# Patient Record
Sex: MALE | Race: WHITE | NOT HISPANIC OR LATINO
[De-identification: names, ages, dates, MRNs, and addresses within clinical notes are randomized per-mention and may not be internally consistent; named-entity substitution may affect disease eponyms.]

---

## 2018-08-09 ENCOUNTER — APPOINTMENT (OUTPATIENT)
Dept: FAMILY MEDICINE | Facility: CLINIC | Age: 58
End: 2018-08-09
Payer: COMMERCIAL

## 2018-08-09 VITALS
SYSTOLIC BLOOD PRESSURE: 136 MMHG | OXYGEN SATURATION: 100 % | HEIGHT: 72 IN | HEART RATE: 54 BPM | DIASTOLIC BLOOD PRESSURE: 85 MMHG | BODY MASS INDEX: 25.73 KG/M2 | RESPIRATION RATE: 14 BRPM | WEIGHT: 190 LBS

## 2018-08-09 PROCEDURE — 99213 OFFICE O/P EST LOW 20 MIN: CPT

## 2018-08-14 NOTE — PLAN
[FreeTextEntry1] : Patient will keep me informed regarding potential exposure to asbestos. Consider occ med referral. \par Discussed mindfulness meditation to reduce anxiety.

## 2018-08-14 NOTE — HISTORY OF PRESENT ILLNESS
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

## 2018-08-14 NOTE — PHYSICAL EXAM
[No Acute Distress] : no acute distress [Well Nourished] : well nourished [Well Developed] : well developed [Well-Appearing] : well-appearing [Normal Sclera/Conjunctiva] : normal sclera/conjunctiva [PERRL] : pupils equal round and reactive to light [Normal Outer Ear/Nose] : the outer ears and nose were normal in appearance [Normal Oropharynx] : the oropharynx was normal [Normal TMs] : both tympanic membranes were normal [No JVD] : no jugular venous distention [Supple] : supple [No Lymphadenopathy] : no lymphadenopathy [No Respiratory Distress] : no respiratory distress  [Clear to Auscultation] : lungs were clear to auscultation bilaterally [No Accessory Muscle Use] : no accessory muscle use [Normal Rate] : normal rate  [Regular Rhythm] : with a regular rhythm [Normal S1, S2] : normal S1 and S2 [No Murmur] : no murmur heard [Pedal Pulses Present] : the pedal pulses are present [No Edema] : there was no peripheral edema [No Extremity Clubbing/Cyanosis] : no extremity clubbing/cyanosis [Soft] : abdomen soft [Non Tender] : non-tender [Non-distended] : non-distended [Normal Bowel Sounds] : normal bowel sounds [Normal Supraclavicular Nodes] : no supraclavicular lymphadenopathy [Normal Posterior Cervical Nodes] : no posterior cervical lymphadenopathy [Normal Anterior Cervical Nodes] : no anterior cervical lymphadenopathy [No CVA Tenderness] : no CVA  tenderness [No Spinal Tenderness] : no spinal tenderness [No Joint Swelling] : no joint swelling [Grossly Normal Strength/Tone] : grossly normal strength/tone [No Rash] : no rash [Normal Gait] : normal gait [Coordination Grossly Intact] : coordination grossly intact [No Focal Deficits] : no focal deficits [Normal Affect] : the affect was normal [Normal Insight/Judgement] : insight and judgment were intact

## 2018-10-05 ENCOUNTER — MEDICATION RENEWAL (OUTPATIENT)
Age: 58
End: 2018-10-05

## 2019-07-01 ENCOUNTER — APPOINTMENT (OUTPATIENT)
Dept: FAMILY MEDICINE | Facility: CLINIC | Age: 59
End: 2019-07-01

## 2019-08-08 ENCOUNTER — APPOINTMENT (OUTPATIENT)
Dept: FAMILY MEDICINE | Facility: CLINIC | Age: 59
End: 2019-08-08
Payer: COMMERCIAL

## 2019-08-08 VITALS
HEART RATE: 59 BPM | DIASTOLIC BLOOD PRESSURE: 81 MMHG | OXYGEN SATURATION: 100 % | SYSTOLIC BLOOD PRESSURE: 137 MMHG | HEIGHT: 72 IN | RESPIRATION RATE: 15 BRPM | BODY MASS INDEX: 25.73 KG/M2 | WEIGHT: 190 LBS

## 2019-08-08 PROCEDURE — 99213 OFFICE O/P EST LOW 20 MIN: CPT

## 2019-08-08 NOTE — PHYSICAL EXAM
[Normal] : no acute distress, well nourished, well developed and well-appearing [Normal Sclera/Conjunctiva] : normal sclera/conjunctiva [Normal Outer Ear/Nose] : the outer ears and nose were normal in appearance [Normal Oropharynx] : the oropharynx was normal [Normal TMs] : both tympanic membranes were normal [No Lymphadenopathy] : no lymphadenopathy [Supple] : supple [No Respiratory Distress] : no respiratory distress  [No Accessory Muscle Use] : no accessory muscle use [Clear to Auscultation] : lungs were clear to auscultation bilaterally [Normal Rate] : normal rate  [Regular Rhythm] : with a regular rhythm [Normal S1, S2] : normal S1 and S2 [No Edema] : there was no peripheral edema [Normal Supraclavicular Nodes] : no supraclavicular lymphadenopathy [Normal Posterior Cervical Nodes] : no posterior cervical lymphadenopathy [Normal Anterior Cervical Nodes] : no anterior cervical lymphadenopathy [Grossly Normal Strength/Tone] : grossly normal strength/tone [No Rash] : no rash [Coordination Grossly Intact] : coordination grossly intact [Normal Gait] : normal gait [Normal Insight/Judgement] : insight and judgment were intact [de-identified] : patient is polite, talkative, appears anxious

## 2019-08-08 NOTE — HISTORY OF PRESENT ILLNESS
[FreeTextEntry8] : Patient presents with cc of "multiple chemical sensitivity" for several decades. He is concerned that it will progress and wants to establish a monitoring schedule.\par He was evaluated at [facility] several years ago and the physician explained the importance of eliminating chemicals from his household.\par Since that time he has seen several specialists including occupational medicine and is concerned that physicians don't believe him. \par His sensitivity to chemicals is associated with intermittent tingling, dizziness, fatigue, difficulty breathing, and depression. He feels the onset of his symptoms was precipitated by mononucleosis. \par He mentioned hearing fibromyalgia from a doctor at some point as well.

## 2019-08-08 NOTE — PLAN
[FreeTextEntry1] : \par Patient reports a history of sensitivity to multiple chemicals- obtain reports from specialists for review; consult allergy/immunology. Obtain labs; avoid triggers. \par \par Anxiety-  patient reports a positive outlook and states he feels better when spending time outdoors. I would like to offer counseling to provide additional support.

## 2019-08-08 NOTE — REVIEW OF SYSTEMS
[Fatigue] : fatigue [Anxiety] : anxiety [Depression] : depression [Negative] : Cardiovascular [FreeTextEntry6] : intermittent shortness of breath- reports last PFT was normal [Skin Rash] : no skin rash [de-identified] : tingling

## 2019-09-19 ENCOUNTER — APPOINTMENT (OUTPATIENT)
Dept: PSYCHIATRY | Facility: CLINIC | Age: 59
End: 2019-09-19

## 2020-01-27 ENCOUNTER — APPOINTMENT (OUTPATIENT)
Dept: PULMONOLOGY | Facility: CLINIC | Age: 60
End: 2020-01-27

## 2020-03-12 ENCOUNTER — APPOINTMENT (OUTPATIENT)
Dept: POPULATION HEALTH | Facility: CLINIC | Age: 60
End: 2020-03-12

## 2020-05-11 ENCOUNTER — APPOINTMENT (OUTPATIENT)
Dept: POPULATION HEALTH | Facility: CLINIC | Age: 60
End: 2020-05-11
Payer: COMMERCIAL

## 2020-05-11 DIAGNOSIS — Z82.0 FAMILY HISTORY OF EPILEPSY AND OTHER DISEASES OF THE NERVOUS SYSTEM: ICD-10-CM

## 2020-05-11 DIAGNOSIS — Z84.89 FAMILY HISTORY OF OTHER SPECIFIED CONDITIONS: ICD-10-CM

## 2020-05-11 PROCEDURE — 99203 OFFICE O/P NEW LOW 30 MIN: CPT | Mod: 95

## 2020-05-11 NOTE — HISTORY OF PRESENT ILLNESS
[Home] : at home, [unfilled] , at the time of the visit. [Medical Office: ([facility])___] : at the medical office located in  [Patient] : the patient [FreeTextEntry1] : s. patient seen for a telehealth visit during the covid 19 epidemic. 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 he is calling tofind out any possible treatments or suggestions for his condition.\par patient explains that his condition started in the 80's, when he was in his late 20's. at that time he was recovering from mononucleosis and was undergoing a difficult period in his life, with lots of stress, giving up smoking. patient started to notice fullness of his nose, burning of the eyes, tingling in his extremities, and pain in some areas in his body, for example his penis. he did notice that his symptoms would improve while outside of his home. he was seen by different doctors and finally was eventually diagnosed with mcs. 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 at one time he underwent an elimination diet test which resulted in increased fatigue. at one time he moved to close to [place], [place], where he felt better. he had to return to [place] and became worse. he then tried ot move to [place] but again his symptoms did not improve. \par at present patient reports symptoms of nasal fullness, burning of his eyes, tingling of the extremities, penal pain, brain fog, trouble remembering. he reports fatigue and sleepiness during the day. \par patient has been consulting several doctors, allergists and dietitians. 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 \par on camera patient looked in no difficulty breathing. \par counseling and support provided. i suggested two possible venues, one would be to exclude upper airway resistance syndrome and the other would be to consult the environmental clinic at [facility]. i explained him that my approach to this condition is avoidance of exposure as the only possible treatment. patient understood. i recommended him to let us know if he needs any additional information.\par i spent some 50 minutes with the patients, two connections done as the first one got cut.

## 2020-05-14 ENCOUNTER — APPOINTMENT (OUTPATIENT)
Dept: FAMILY MEDICINE | Facility: CLINIC | Age: 60
End: 2020-05-14
Payer: COMMERCIAL

## 2020-05-14 PROCEDURE — 99213 OFFICE O/P EST LOW 20 MIN: CPT | Mod: 95

## 2020-05-16 NOTE — PHYSICAL EXAM
[Normal] : no acute distress, well nourished, well developed and well-appearing [Alert and Oriented x3] : oriented to person, place, and time

## 2020-05-16 NOTE — PLAN
[FreeTextEntry1] : -Discussed elimination diet. Will follow up with nutritionist\par -Labs discussed. WIll return to office for follow up shortly.\par -Maintaining safety during COVID pandemic discussed. Explained that an in-office visit can be safely accomodated at this time. \par -Patient will contact me with any concerns.

## 2020-05-16 NOTE — HISTORY OF PRESENT ILLNESS
[Home] : at home, [unfilled] , at the time of the visit. [Patient] : the patient [Other Location: e.g. Home (Enter Location, City,State)___] : at [unfilled] [de-identified] : Patient continues to report symptoms that he attributes to chemical sensitivity. Saw allergist and occupational medicine specialist.\par Has also seen a nutritionist and an elimination diet was suggested. He will be starting with coffee.

## 2020-05-20 ENCOUNTER — APPOINTMENT (OUTPATIENT)
Dept: RHEUMATOLOGY | Facility: CLINIC | Age: 60
End: 2020-05-20

## 2020-05-21 ENCOUNTER — APPOINTMENT (OUTPATIENT)
Dept: PULMONOLOGY | Facility: CLINIC | Age: 60
End: 2020-05-21
Payer: COMMERCIAL

## 2020-05-21 DIAGNOSIS — G47.10 HYPERSOMNIA, UNSPECIFIED: ICD-10-CM

## 2020-05-21 PROCEDURE — 99203 OFFICE O/P NEW LOW 30 MIN: CPT | Mod: 95

## 2020-05-22 ENCOUNTER — APPOINTMENT (OUTPATIENT)
Dept: RHEUMATOLOGY | Facility: CLINIC | Age: 60
End: 2020-05-22
Payer: COMMERCIAL

## 2020-05-22 PROCEDURE — 99203 OFFICE O/P NEW LOW 30 MIN: CPT | Mod: 95

## 2020-05-22 NOTE — HISTORY OF PRESENT ILLNESS
[FreeTextEntry1] : Verbal consent was obtained from patient for 2 way video telehealth visit.  \par Patient was located at his home.  Provider was located at [address] [place] [address]\par Time of visit: 12:35-12:52pm  Duration 17 minutes\par \par 58 y/o man with hx of HLD, chemical sensitivity syndrome.\par \par Patient reports he has several questions regarding fibromyalgia, chemical sensitivity syndrome, daniel barr virus, autoimmunity and diet.  \par Patient reports he does not believe he has fibromyalgia.  He does not have heightened pain sensitivity.  \par He walks 1 hour daily.  \par \par I informed patient that I do not manage chemical sensitivity syndrome.  He is OK with this and rather would like to have my opinion on his multiple questions.  \par

## 2020-05-22 NOTE — ASSESSMENT
[FreeTextEntry1] : -Patient does not appear to meet criteria for FMS\par -All questions were answered\par -f/u PRN

## 2020-05-26 NOTE — DISCUSSION/SUMMARY
[FreeTextEntry1] : The patient has fatigue and multiple symptoms related to many systems.  He seems to be eating a lot of processed food as per history.\par He was advised to eat whole and unprocessed food.  It might be very difficult for him.\par He was advised to go off dairy and gluten for a starter and see me with a nutrition, sleep and exercise dairy in 2-3 weeks.

## 2020-05-26 NOTE — REVIEW OF SYSTEMS
[Recent Wt Gain (___ Lbs)] : ~T recent [unfilled] lb weight gain [Food Intolerance] : food intolerance [Negative] : Endocrine [Arthralgias] : no arthralgias

## 2020-05-26 NOTE — HISTORY OF PRESENT ILLNESS
[Former] : former [TextBox_4] : Forgetfulness, fatigue, body pains, neuropathy. He has abdominal distension on and off. He has had this for decades.  He says he has multiple chemical sensitivities. He had infectious mononucleosis. He was under a lot of stress at that time,severe emotional stresses.He had stopped smoking and was exposed to pesticides.Most of the issues have resolved.\par He sleeps for about 9 hours.  He sleeps from 8 PM to 4.30 AM. He is not refreshed when he wakes up in the morning.\par  He has been cleaning the house with natural products.  He has removed anything that smells.  He washes clothes in pain salt. He feels much improved.\par He cleans with warm water with lemon.\par He is working with a registered dietician and is off coffee.\par  He feels gassy, flatulence.  He tried lactose free free milk. It did not help.\par He stopped smoking about 15 years back.  He has gained weight recently.  He does not know how much.\par \par  [Home] : at home, [unfilled] , at the time of the visit. [Medical Office: ([facility])___] : at the medical office located in

## 2020-07-22 ENCOUNTER — APPOINTMENT (OUTPATIENT)
Dept: GASTROENTEROLOGY | Facility: CLINIC | Age: 60
End: 2020-07-22
Payer: COMMERCIAL

## 2020-07-22 DIAGNOSIS — Z12.11 ENCOUNTER FOR SCREENING FOR MALIGNANT NEOPLASM OF COLON: ICD-10-CM

## 2020-07-22 DIAGNOSIS — Z86.010 PERSONAL HISTORY OF COLONIC POLYPS: ICD-10-CM

## 2020-07-22 PROCEDURE — 99203 OFFICE O/P NEW LOW 30 MIN: CPT | Mod: 95

## 2020-07-22 NOTE — PHYSICAL EXAM
[Auscultation Breath Sounds / Voice Sounds] : lungs were clear to auscultation bilaterally [Bowel Sounds] : normal bowel sounds [Abdomen Tenderness] : non-tender [Abdomen Soft] : soft [Abdomen Mass (___ Cm)] : no abdominal mass palpated [] : no hepato-splenomegaly

## 2020-07-22 NOTE — HISTORY OF PRESENT ILLNESS
[Home] : at home, [unfilled] , at the time of the visit. [Medical Office: ([facility])___] : at the medical office located in  [Verbal consent obtained from patient] : the patient, [unfilled] [de-identified] : Mr. FELECIA MIRAMONTES is a 59 year old male presents for screening colonoscopy. Patient has no complaints of bowel issues, abdominal pain, family history of colon cancer, GERD symptoms. Patient had one episode of blood on toilet paper. Patient had last colonoscopy in 2012. Patient has a personal history of colon polyps. \par \par

## 2020-07-22 NOTE — ASSESSMENT
[FreeTextEntry1] : 58 yo male screening colonoscopy with Suprep. All questions answered. Patient agrees to proceed.

## 2020-08-03 ENCOUNTER — EMERGENCY (EMERGENCY)
Facility: HOSPITAL | Age: 60
LOS: 0 days | Discharge: HOME | End: 2020-08-03
Payer: COMMERCIAL

## 2020-08-03 VITALS
SYSTOLIC BLOOD PRESSURE: 105 MMHG | HEART RATE: 68 BPM | OXYGEN SATURATION: 110 % | DIASTOLIC BLOOD PRESSURE: 76 MMHG | RESPIRATION RATE: 982 BRPM | TEMPERATURE: 99 F

## 2020-08-03 DIAGNOSIS — Z11.59 ENCOUNTER FOR SCREENING FOR OTHER VIRAL DISEASES: ICD-10-CM

## 2020-08-03 PROCEDURE — 99283 EMERGENCY DEPT VISIT LOW MDM: CPT

## 2020-08-03 PROCEDURE — U0003: CPT

## 2020-08-03 NOTE — ED STATDOCS - PATIENT PORTAL LINK FT
You can access the FollowMyHealth Patient Portal offered by [facility] by registering at the following website: [email or web address]. By joining [facility]’s FollowMyHealth portal, you will also be able to view your health information using other applications (apps) compatible with our system.

## 2020-08-03 NOTE — ED STATDOCS - OBJECTIVE STATEMENT
60 yo male presents for covid testing. Pt is scheduled to have colonoscopy with Dr. watkins on friday and needs testing.

## 2020-08-04 LAB — SARS-COV-2 RNA SPEC QL NAA+PROBE: SIGNIFICANT CHANGE UP

## 2020-08-07 ENCOUNTER — APPOINTMENT (OUTPATIENT)
Dept: GASTROENTEROLOGY | Facility: AMBULATORY MEDICAL SERVICES | Age: 60
End: 2020-08-07

## 2020-08-11 ENCOUNTER — APPOINTMENT (OUTPATIENT)
Dept: PULMONOLOGY | Facility: CLINIC | Age: 60
End: 2020-08-11
Payer: COMMERCIAL

## 2020-08-11 VITALS
BODY MASS INDEX: 26.47 KG/M2 | DIASTOLIC BLOOD PRESSURE: 64 MMHG | TEMPERATURE: 96.9 F | OXYGEN SATURATION: 97 % | HEIGHT: 69.29 IN | WEIGHT: 180.78 LBS | HEART RATE: 59 BPM | SYSTOLIC BLOOD PRESSURE: 100 MMHG

## 2020-08-11 DIAGNOSIS — E06.9 THYROIDITIS, UNSPECIFIED: ICD-10-CM

## 2020-08-11 PROCEDURE — 99215 OFFICE O/P EST HI 40 MIN: CPT

## 2020-08-11 NOTE — PHYSICAL EXAM
[No Acute Distress] : no acute distress [Normal Oropharynx] : normal oropharynx [Normal Appearance] : normal appearance [Normal S1, S2] : normal s1, s2 [No Neck Mass] : no neck mass [Normal Rate/Rhythm] : normal rate/rhythm [No Resp Distress] : no resp distress [Clear to Auscultation Bilaterally] : clear to auscultation bilaterally [No Murmurs] : no murmurs [Normal Gait] : normal gait [Benign] : benign [No Abnormalities] : no abnormalities [No Clubbing] : no clubbing [No Cyanosis] : no cyanosis [FROM] : FROM [No Edema] : no edema [Normal Color/ Pigmentation] : normal color/ pigmentation [Oriented x3] : oriented x3 [No Focal Deficits] : no focal deficits [Normal Affect] : normal affect

## 2020-08-11 NOTE — REVIEW OF SYSTEMS
[Fatigue] : fatigue [Dizziness] : dizziness [Memory Loss] : memory loss [Anxiety] : anxiety [Negative] : Endocrine

## 2020-08-11 NOTE — DISCUSSION/SUMMARY
[FreeTextEntry1] : Will order blood work to work up fatigue. \par Details discussed with the patient.\par Advised to try elimination diet. Discussed in detail with the patient.\par Foundational functional supplements advised.

## 2020-08-17 LAB
ALBUMIN SERPL ELPH-MCNC: 4.4 G/DL
ALP BLD-CCNC: 76 U/L
ALT SERPL-CCNC: 10 U/L
ANION GAP SERPL CALC-SCNC: 15 MMOL/L
AST SERPL-CCNC: 16 U/L
BASOPHILS # BLD AUTO: 0.05 K/UL
BASOPHILS NFR BLD AUTO: 1 %
BILIRUB SERPL-MCNC: 0.5 MG/DL
BUN SERPL-MCNC: 19 MG/DL
CALCIUM SERPL-MCNC: 9.6 MG/DL
CHLORIDE SERPL-SCNC: 101 MMOL/L
CO2 SERPL-SCNC: 26 MMOL/L
CREAT SERPL-MCNC: 0.93 MG/DL
CRP SERPL-MCNC: <0.1 MG/DL
EBV EA AB SER IA-ACNC: 68.9 U/ML
EBV EA AB TITR SER IF: POSITIVE
EBV EA IGG SER QL IA: 309 U/ML
EBV EA IGG SER-ACNC: POSITIVE
EBV EA IGM SER IA-ACNC: NEGATIVE
EBV PATRN SPEC IB-IMP: NORMAL
EBV VCA IGG SER IA-ACNC: 552 U/ML
EBV VCA IGM SER QL IA: <10 U/ML
EOSINOPHIL # BLD AUTO: 0.07 K/UL
EOSINOPHIL NFR BLD AUTO: 1.3 %
EPSTEIN-BARR VIRUS CAPSID ANTIGEN IGG: POSITIVE
GGT SERPL-CCNC: 11 U/L
GLUCOSE SERPL-MCNC: 66 MG/DL
HCT VFR BLD CALC: 44.8 %
HGB BLD-MCNC: 14.1 G/DL
IMM GRANULOCYTES NFR BLD AUTO: 0.2 %
LEAD BLD-MCNC: <1 UG/DL
LYMPHOCYTES # BLD AUTO: 1.47 K/UL
LYMPHOCYTES NFR BLD AUTO: 28.1 %
MAN DIFF?: NORMAL
MCHC RBC-ENTMCNC: 27.5 PG
MCHC RBC-ENTMCNC: 31.5 GM/DL
MCV RBC AUTO: 87.5 FL
MERCURY BLD-MCNC: NORMAL UG/L
MONOCYTES # BLD AUTO: 0.5 K/UL
MONOCYTES NFR BLD AUTO: 9.5 %
NEUTROPHILS # BLD AUTO: 3.14 K/UL
NEUTROPHILS NFR BLD AUTO: 59.9 %
PLATELET # BLD AUTO: 246 K/UL
POTASSIUM SERPL-SCNC: 4.7 MMOL/L
PROT SERPL-MCNC: 6.8 G/DL
RBC # BLD: 5.12 M/UL
RBC # FLD: 12.4 %
SODIUM SERPL-SCNC: 142 MMOL/L
T3 SERPL-MCNC: 89 NG/DL
T3FREE SERPL-MCNC: 2.67 PG/ML
T3REVERSE SERPL-MCNC: 14.2 NG/DL
T4 FREE SERPL-MCNC: 1 NG/DL
T4 SERPL-MCNC: 5.2 UG/DL
TSH SERPL-ACNC: 2.87 UIU/ML
WBC # FLD AUTO: 5.24 K/UL

## 2020-08-25 ENCOUNTER — APPOINTMENT (OUTPATIENT)
Dept: DISASTER EMERGENCY | Facility: CLINIC | Age: 60
End: 2020-08-25

## 2020-08-28 ENCOUNTER — APPOINTMENT (OUTPATIENT)
Dept: GASTROENTEROLOGY | Facility: AMBULATORY MEDICAL SERVICES | Age: 60
End: 2020-08-28

## 2020-09-10 ENCOUNTER — LABORATORY RESULT (OUTPATIENT)
Age: 60
End: 2020-09-10

## 2020-09-11 ENCOUNTER — APPOINTMENT (OUTPATIENT)
Dept: DISASTER EMERGENCY | Facility: CLINIC | Age: 60
End: 2020-09-11

## 2020-09-15 ENCOUNTER — RESULT REVIEW (OUTPATIENT)
Age: 60
End: 2020-09-15

## 2020-09-15 ENCOUNTER — APPOINTMENT (OUTPATIENT)
Dept: GASTROENTEROLOGY | Facility: AMBULATORY MEDICAL SERVICES | Age: 60
End: 2020-09-15
Payer: COMMERCIAL

## 2020-09-15 PROCEDURE — 45380 COLONOSCOPY AND BIOPSY: CPT

## 2020-09-22 ENCOUNTER — APPOINTMENT (OUTPATIENT)
Dept: PULMONOLOGY | Facility: CLINIC | Age: 60
End: 2020-09-22
Payer: COMMERCIAL

## 2020-09-22 PROCEDURE — 99213 OFFICE O/P EST LOW 20 MIN: CPT | Mod: 95

## 2020-09-24 NOTE — HISTORY OF PRESENT ILLNESS
[TextBox_4] : Patient with history of multiple chemical sensitivity syndrome is here for followup. The patient has improved slightly since the last evaluation. He has been following the diet and exercising. He has been taking supplements listed. His blood test results were explained.\par He had multiple questions about continuing certain supplements. \par He states that his fatigue is improved significantly. [Home] : at home, [unfilled] , at the time of the visit. [Medical Office: ([facility])___] : at the medical office located in

## 2020-09-24 NOTE — DISCUSSION/SUMMARY
[FreeTextEntry1] : The patient currently is taking Foundational  functional supplements including omega-3, vitamin C, vitamin D and magnesium.

## 2020-10-19 ENCOUNTER — APPOINTMENT (OUTPATIENT)
Dept: PULMONOLOGY | Facility: CLINIC | Age: 60
End: 2020-10-19
Payer: COMMERCIAL

## 2020-10-19 VITALS
HEIGHT: 69.29 IN | OXYGEN SATURATION: 100 % | BODY MASS INDEX: 28.12 KG/M2 | HEART RATE: 51 BPM | DIASTOLIC BLOOD PRESSURE: 75 MMHG | WEIGHT: 192 LBS | SYSTOLIC BLOOD PRESSURE: 127 MMHG | TEMPERATURE: 97.3 F

## 2020-10-19 PROCEDURE — 99213 OFFICE O/P EST LOW 20 MIN: CPT | Mod: 25

## 2020-10-19 PROCEDURE — 99072 ADDL SUPL MATRL&STAF TM PHE: CPT

## 2020-10-19 NOTE — DISCUSSION/SUMMARY
[FreeTextEntry1] : The patient wants a letter that he can work remotely. 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

## 2020-10-19 NOTE — PHYSICAL EXAM
[No Acute Distress] : no acute distress [Normal Oropharynx] : normal oropharynx [Normal Appearance] : normal appearance [No Neck Mass] : no neck mass [Normal Rate/Rhythm] : normal rate/rhythm [Normal S1, S2] : normal s1, s2 [No Murmurs] : no murmurs [Clear to Auscultation Bilaterally] : clear to auscultation bilaterally [No Resp Distress] : no resp distress [Benign] : benign [No Abnormalities] : no abnormalities [No Clubbing] : no clubbing [Normal Gait] : normal gait [No Cyanosis] : no cyanosis [No Edema] : no edema [FROM] : FROM [Normal Color/ Pigmentation] : normal color/ pigmentation [No Focal Deficits] : no focal deficits [Oriented x3] : oriented x3 [Normal Affect] : normal affect

## 2020-10-19 NOTE — REVIEW OF SYSTEMS
[Fatigue] : fatigue [Cough] : cough [Hay Fever] : hay fever [GERD] : gerd [Arthralgias] : arthralgias [Memory Loss] : memory loss [Anxiety] : anxiety [Negative] : Endocrine

## 2020-10-19 NOTE — HISTORY OF PRESENT ILLNESS
[Never] : never [TextBox_4] : The patient history of multiple chemical sensitivity returns for followup. He stated he states he is improved. He has much less muscle ache and cough and cold. His concentration is generally poor. That has improved since the last visit. He currently works remotely. He is very anxious thinking about the possibility of being exposed to Covid.\par He sleeps poorly. He wakes up several times at night.\par He lives with his elderly parents and he thinks that he might infect his parents.\par His MSQ is 72.

## 2020-11-08 DIAGNOSIS — Z00.00 ENCOUNTER FOR GENERAL ADULT MEDICAL EXAMINATION W/OUT ABNORMAL FINDINGS: ICD-10-CM

## 2020-11-19 ENCOUNTER — APPOINTMENT (OUTPATIENT)
Dept: FAMILY MEDICINE | Facility: CLINIC | Age: 60
End: 2020-11-19

## 2020-12-05 LAB
ARSENIC, BLOOD: 5 UG/L
CADMIUM SERPL-MCNC: <0.5 UG/L
LEAD BLD-MCNC: <1 UG/DL
MERCURY BLD-MCNC: <1 UG/L

## 2021-03-22 ENCOUNTER — APPOINTMENT (OUTPATIENT)
Dept: POPULATION HEALTH | Facility: CLINIC | Age: 61
End: 2021-03-22
Payer: COMMERCIAL

## 2021-03-22 PROCEDURE — 99442: CPT

## 2021-03-22 NOTE — HISTORY OF PRESENT ILLNESS
[Home] : at home, [unfilled] , at the time of the visit. [Medical Office: ([facility])___] : at the medical office located in  [Verbal consent obtained from patient] : the patient, [unfilled] [FreeTextEntry1] : s. patient seen for a telehealth visit during the covid 19 epidemic. 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 he is calling tofind out any possible treatments or suggestions for his condition.\par patient continues to report nasal fullness, burning of his eyes, tingling of the extremities, penial pain, brain fog, memory difficulties, significant fatigue and sleepiness during the day. \par patient has been staying at home during the epidemic. \par patient calling because he got a HIPPA release form from his employer and is asking recommendations as to how to complete this form. \par

## 2021-03-22 NOTE — ASSESSMENT
[FreeTextEntry1] : o.on telephone, patient sounded in no difficulty breathing.\par a. overall stable, mcs, permanent condition. \par p. counseling and support provided. i explained the patient that, to my understanding, he has been requested to provide access to personal medical information to a non-medical facility. i recommended him that, to my knowledge, the letters that are produced by doctors and handed to the patient to share with employers contain all the necessary medical information that patient would be required to share with a non-medical facility. i recommended him to complete the form releasing access to any such letters. we can revise this in case the employer requests any additional or more specific information. patient understood. patient stated he had spoke to a  about this form as well, i recommended him to definitely seek 's recommendations. \par i spent some 15 minutes with the patient.

## 2021-05-06 ENCOUNTER — APPOINTMENT (OUTPATIENT)
Dept: PULMONOLOGY | Facility: CLINIC | Age: 61
End: 2021-05-06
Payer: COMMERCIAL

## 2021-05-06 VITALS
DIASTOLIC BLOOD PRESSURE: 80 MMHG | TEMPERATURE: 97.5 F | WEIGHT: 207 LBS | OXYGEN SATURATION: 98 % | BODY MASS INDEX: 30.31 KG/M2 | SYSTOLIC BLOOD PRESSURE: 113 MMHG | HEART RATE: 60 BPM | HEIGHT: 69.29 IN

## 2021-05-06 DIAGNOSIS — G47.00 INSOMNIA, UNSPECIFIED: ICD-10-CM

## 2021-05-06 DIAGNOSIS — J30.9 ALLERGIC RHINITIS, UNSPECIFIED: ICD-10-CM

## 2021-05-06 PROCEDURE — 99072 ADDL SUPL MATRL&STAF TM PHE: CPT

## 2021-05-06 PROCEDURE — 99213 OFFICE O/P EST LOW 20 MIN: CPT

## 2021-05-06 NOTE — DISCUSSION/SUMMARY
[FreeTextEntry1] : The patient was advised to clear the mold and keep the house free of dampness.\par Improved ventilation in the house becomes essential after cleaning. Blood tests were drawn to check her mold allergy.

## 2021-05-06 NOTE — REVIEW OF SYSTEMS
[Fatigue] : fatigue [Cough] : cough [Myalgias] : myalgias [Numbness] : numbness [Anxiety] : anxiety [Negative] : Endocrine

## 2021-05-06 NOTE — HISTORY OF PRESENT ILLNESS
[TextBox_4] : The patient is a 60-year-old male with history of cough, brain fog , sleeping difficulties, memory issues, stuffiness of the nose and burning in the eyes. He has history of multiple chemical sensitivity.\par Recently identified fungus air conditioning system in his home. He reports having molds in the ventilating ducts at home. He has begun to clear it. The symptoms are already better. He is here to see if anything more can be done.

## 2021-06-07 ENCOUNTER — APPOINTMENT (OUTPATIENT)
Dept: POPULATION HEALTH | Facility: CLINIC | Age: 61
End: 2021-06-07
Payer: COMMERCIAL

## 2021-06-07 DIAGNOSIS — T78.40XA ALLERGY, UNSPECIFIED, INITIAL ENCOUNTER: ICD-10-CM

## 2021-06-07 PROCEDURE — 99441: CPT

## 2021-06-08 ENCOUNTER — APPOINTMENT (OUTPATIENT)
Dept: PULMONOLOGY | Facility: CLINIC | Age: 61
End: 2021-06-08
Payer: COMMERCIAL

## 2021-06-08 DIAGNOSIS — Z77.120 CONTACT WITH AND (SUSPECTED) EXPOSURE TO MOLD (TOXIC): ICD-10-CM

## 2021-06-08 PROCEDURE — 99213 OFFICE O/P EST LOW 20 MIN: CPT | Mod: 95

## 2021-06-08 NOTE — HISTORY OF PRESENT ILLNESS
[TextBox_4] : The patient is concerned about mollds. His last tests were negative. Results were discussed with the patient. He wanted a  HLA testing done. He is looking to change the place of his residence. He was advised to move to a different place as he has done everything he possibly can in its current location. He is currently staying with his parents and looking to move out.\par The patient states that his symptoms are significantly improved. But he wants to know what exactly is wrong with him.\par  [Home] : at home, [unfilled] , at the time of the visit. [Medical Office: ([facility])___] : at the medical office located in

## 2021-06-09 NOTE — HISTORY OF PRESENT ILLNESS
[FreeTextEntry1] : s. patient seen for a telephone visit during the covid 19 epidemic. \par patient continues to report nasal fullness, burning of his eyes, tingling of the extremities, penial pain, brain fog, memory difficulties, significant fatigue and sleepiness during the day. he continues to be symptomatic of his condition, with varying degrees of severity on different days. recently found that he was not feeling well when standing close to the a/c vents at his home. 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 he has been trying some remediation at home and is feeling slightly better at this time. \par patient continues at home during the epidemic.\par

## 2021-06-09 NOTE — ASSESSMENT
[FreeTextEntry1] : o.on telephone, patient sounded in no difficulty breathing. he was alert and oriented x 3, his responses were adequate. \par a. mcs, permanent condition. \par p. counseling and support provided. i clarified that mold can be contributing to some degree to his condition but that it would not be the unique factor. i reassure him to control mold within reasonable measures but not to delve so deeply into it. patient asked about some type of genetic test for mold, i clarified that to my knowledge may of these tests for mold have not been standardized and that we at our clinic do not generally recommend them. in any case, i explain that the recommendation would be avoidance of exposure, no matter the result of any tests he may have done. i explained that his pulmonary md had already done allergy tests for mold which came negative. we discussed about patient's work situation. 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 a letter in those terms was produced and will be mailed to patient. \par i spent some 8 minutes with the patient.

## 2021-08-11 ENCOUNTER — APPOINTMENT (OUTPATIENT)
Dept: FAMILY MEDICINE | Facility: CLINIC | Age: 61
End: 2021-08-11

## 2021-12-15 ENCOUNTER — APPOINTMENT (OUTPATIENT)
Dept: FAMILY MEDICINE | Facility: CLINIC | Age: 61
End: 2021-12-15
Payer: COMMERCIAL

## 2021-12-15 VITALS
HEART RATE: 63 BPM | OXYGEN SATURATION: 98 % | WEIGHT: 190 LBS | SYSTOLIC BLOOD PRESSURE: 104 MMHG | BODY MASS INDEX: 27.2 KG/M2 | HEIGHT: 70 IN | TEMPERATURE: 97.1 F | DIASTOLIC BLOOD PRESSURE: 74 MMHG

## 2021-12-15 DIAGNOSIS — R35.0 FREQUENCY OF MICTURITION: ICD-10-CM

## 2021-12-15 PROCEDURE — 99213 OFFICE O/P EST LOW 20 MIN: CPT

## 2021-12-15 RX ORDER — SODIUM SULFATE, POTASSIUM SULFATE, MAGNESIUM SULFATE 17.5; 3.13; 1.6 G/ML; G/ML; G/ML
17.5-3.13-1.6 SOLUTION, CONCENTRATE ORAL
Qty: 1 | Refills: 0 | Status: COMPLETED | COMMUNITY
Start: 2020-07-22 | End: 2021-12-15

## 2021-12-19 NOTE — PLAN
[FreeTextEntry1] : \par Anxiety and depression:\par Concern for patient's severe anxiety and depression and history of suicidal ideation\par Denies intent or plan to harm himself \par Importance of pursuing appointment with psychiatry discussed\par Contact information for psychiatrist and crisis center provided\par Small quantity of clonazepam refilled\par \par Fatigue:\par Check labs

## 2021-12-19 NOTE — PHYSICAL EXAM
[Normal] : no acute distress, well nourished, well developed and well-appearing [Normal Sclera/Conjunctiva] : normal sclera/conjunctiva [No JVD] : no jugular venous distention [No Respiratory Distress] : no respiratory distress  [No Accessory Muscle Use] : no accessory muscle use [Clear to Auscultation] : lungs were clear to auscultation bilaterally [Normal Rate] : normal rate  [Regular Rhythm] : with a regular rhythm [Normal S1, S2] : normal S1 and S2 [Coordination Grossly Intact] : coordination grossly intact [Normal Gait] : normal gait [Normal Insight/Judgement] : insight and judgment were intact [de-identified] : polite, talkative, appears anxious

## 2021-12-19 NOTE — HEALTH RISK ASSESSMENT
[0] : 1) Little interest or pleasure doing things: Not at all (0) [1] : 2) Feeling down, depressed, or hopeless for several days (1) [[id number]] : 1

## 2021-12-19 NOTE — HISTORY OF PRESENT ILLNESS
[de-identified] : Patient presents for follow up. \par Indicates that he saw specialists in [place] regarding his allergies and sensitivities.\par Notes that he discovered exposure to mold in his home\par The specialists in [place] also noted his EBV titers were high. Was treated with Valtrex. \par He was also noted to have low B12 and iodine levels.\par His symptoms leading up to his eval in [place] included dizziness when standing, urinary frequency, and waking up having had a bowel movement in bed. In addition, he experienced joint issues and swelling that are now gone. Also had cough and difficulty breathing as well as memory issues.\par Patient notes that living with his parents is stressful.\par He is now retired.\par Continues to experience fatigue\par Would like to check his liver and kidney function\par Completed COVID vaccine series (3 doses)- Pfizer- tolerated well\par Requests Clonazepam refill.\par Notes that he often feels depressed and anxious. Has thought about suicide but does not have intentions of harming himself. Notes that if he experiences those thoughts he is able to feel better after going outside.

## 2021-12-23 ENCOUNTER — NON-APPOINTMENT (OUTPATIENT)
Age: 61
End: 2021-12-23

## 2022-01-12 RX ORDER — CYANOCOBALAMIN (VITAMIN B-12) 1000MCG/15
1000 LIQUID (ML) ORAL
Refills: 0 | Status: COMPLETED | COMMUNITY
Start: 2021-12-15 | End: 2022-01-12

## 2022-01-13 ENCOUNTER — APPOINTMENT (OUTPATIENT)
Dept: FAMILY MEDICINE | Facility: CLINIC | Age: 62
End: 2022-01-13
Payer: COMMERCIAL

## 2022-01-13 VITALS
HEIGHT: 69 IN | SYSTOLIC BLOOD PRESSURE: 128 MMHG | WEIGHT: 185 LBS | BODY MASS INDEX: 27.4 KG/M2 | OXYGEN SATURATION: 99 % | TEMPERATURE: 97.6 F | DIASTOLIC BLOOD PRESSURE: 82 MMHG | RESPIRATION RATE: 20 BRPM | HEART RATE: 54 BPM

## 2022-01-13 DIAGNOSIS — Z01.818 ENCOUNTER FOR OTHER PREPROCEDURAL EXAMINATION: ICD-10-CM

## 2022-01-13 DIAGNOSIS — Z86.19 PERSONAL HISTORY OF OTHER INFECTIOUS AND PARASITIC DISEASES: ICD-10-CM

## 2022-01-13 PROCEDURE — 99213 OFFICE O/P EST LOW 20 MIN: CPT

## 2022-01-13 NOTE — PHYSICAL EXAM
[Normal] : no respiratory distress, lungs were clear to auscultation bilaterally and no accessory muscle use [Normal Rate] : normal rate  [Normal Gait] : normal gait [Normal Affect] : the affect was normal [de-identified] : pressured speech

## 2022-01-13 NOTE — HISTORY OF PRESENT ILLNESS
[de-identified] : \par Patient presents with several concerns regarding his overall health which he feels stem from sensitivities to chemicals as well as living in a home with mold.\par Specifically he has been noted to have a low Vitamin B12 level, high Magnesium level and low iodine level.\par He has seen several doctors including specialists in [place] and [facility] and recently saw an endocrinologist.\par He is very concerned about his Vitamin B12 level being low and notes that he had a B12 injection on Monday\par Patient will be trying to move out of his house within the next week and is looking at [place], [place] and [place].\par Patient notes that he attended a  this week for his nephew who committed suicide\par Patient has not been to see a psychiatrist since our last visit but notes that he is eager to live and to do what is necessary to optimize his health\par He knows he needs to get out of his home because he feels much better as soon as he leaves it\par He notes he is concerned that his Vitamin B12 deficiency is life threatening\par He has been reading several things online regarding his health and has been speaking with other patients suffering from the same ailments\par Patient would like to obtain a B12 shot today

## 2022-01-13 NOTE — PLAN
[FreeTextEntry1] : \par Patient is suffering from a number of complaints that may certainly be related to mold exposure, chemical sensitivities, etc, but I advised that patient that I am concerned about the stress and anxiety he is clearly exhibiting. It is imperative that he establish care with a mental health provider as soon as possible. With the patient moving, potentially out of state, it may become necessary for him to obtain new physicians. The contact information for the [facility] was provided during our last visit and patient will be looking to make arrangements with providers in his new location.\par \par With the patient having obtained a B12 injection earlier this week it was not appropriate to inject an additional dose today. I advised the patient that it was reassuring to see that his level had doubled and was in the low normal range on his last measurement when he was taking oral therapy. With weekly B12 injections, his level should continue to come up nicely. The patient and I discussed the appropriate method of injecting a medication intramuscularly and I recommended that the patient continue to pursue his injections in the setting of a health care provider. \par \par In preparation for his visit, I reviewed all of his recent laboratory studies including those performed in conjunction with a physician in [place]. \par \par Patient will require continued support and close monitoring and will contact me for any concerns.

## 2022-02-03 ENCOUNTER — NON-APPOINTMENT (OUTPATIENT)
Age: 62
End: 2022-02-03

## 2022-04-06 ENCOUNTER — RX RENEWAL (OUTPATIENT)
Age: 62
End: 2022-04-06

## 2022-07-07 ENCOUNTER — APPOINTMENT (OUTPATIENT)
Dept: FAMILY MEDICINE | Facility: CLINIC | Age: 62
End: 2022-07-07

## 2022-07-07 VITALS
OXYGEN SATURATION: 97 % | RESPIRATION RATE: 14 BRPM | HEART RATE: 53 BPM | TEMPERATURE: 97.9 F | WEIGHT: 205 LBS | BODY MASS INDEX: 30.36 KG/M2 | SYSTOLIC BLOOD PRESSURE: 122 MMHG | HEIGHT: 69 IN | DIASTOLIC BLOOD PRESSURE: 68 MMHG

## 2022-07-07 DIAGNOSIS — Z91.09 OTHER ALLERGY STATUS, OTHER THAN TO DRUGS AND BIOLOGICAL SUBSTANCES: ICD-10-CM

## 2022-07-07 PROCEDURE — 99212 OFFICE O/P EST SF 10 MIN: CPT

## 2022-07-07 RX ORDER — CHOLESTYRAMINE 4 G/9G
4 POWDER, FOR SUSPENSION ORAL
Qty: 378 | Refills: 0 | Status: COMPLETED | COMMUNITY
Start: 2022-01-24

## 2022-07-07 RX ORDER — CLONAZEPAM 0.5 MG/1
0.5 TABLET ORAL DAILY
Qty: 10 | Refills: 0 | Status: COMPLETED | COMMUNITY
End: 2022-07-07

## 2022-07-25 NOTE — PHYSICAL EXAM
[No Acute Distress] : no acute distress [Well Nourished] : well nourished [Well Developed] : well developed [Well-Appearing] : well-appearing [Normal Sclera/Conjunctiva] : normal sclera/conjunctiva [No Lymphadenopathy] : no lymphadenopathy [Supple] : supple [No Respiratory Distress] : no respiratory distress  [No Accessory Muscle Use] : no accessory muscle use [Clear to Auscultation] : lungs were clear to auscultation bilaterally [Normal Rate] : normal rate  [Regular Rhythm] : with a regular rhythm [Normal S1, S2] : normal S1 and S2 [Normal Gait] : normal gait [de-identified] : Patient speaks rapidly; appears anxious

## 2022-07-25 NOTE — HISTORY OF PRESENT ILLNESS
[de-identified] : \par Patient presents with concerns regarding abnormal labs completed with another internist \par He notes that his CO2 was one point above the upper limit of normal in June\par Prior abnormalities include RDW and ALT levels that were slightly out of range but they are again normal\par Patient notes that he is generally feeling better\par Seeing alternative medicine physicians \par Notes occasional irritated breathing- learned that he is sensitive to cotton and must avoid it\par He is very sensitive to chemicals in his home and has been trying to live elsewhere\par Seeing a physician for B12 injections in view of pernicious anemia\par Will be following up with GI for endoscopy\par

## 2022-07-25 NOTE — PLAN
[FreeTextEntry1] : \par Patient with history of pernicious anemia and chemical sensitivity experiencing anxiety secondary to lab results that were minimally out of range but have since normalized. Reviewed and discussed the labs completed with the other physicians patient is seeing at length. Support provided.\par Expressed concern to the patient that he may benefit from seeing a mental health specialist to help him cope with the stress of his medical condition. Patient declined psych referral at this time.\par He will pursue GI follow up and with regard to preventive screening, he will undergo a full body skin check as well as urology follow up\par We will check his lipid levels as they have been elevated in the past and it does not appear that they have been checked recently. Will also obtain a CBC, CMP, Vitamin B12 and Vitamin D.\par Patient will contact me with any concerns.

## 2022-09-07 NOTE — REVIEW OF SYSTEMS
Return to Pain Management to have Right SI injection.     Please follow up with [facility] in November 2021 for 1 year postop appt.    An X-ray will be done the same day of your next appointment.    Please continue to follow your post-operative restrictions as advised by your surgeon.      If there are any questions, concerns, or changes in symptoms please notify your RN, Suri Yoon at [phone]    [Negative] : Heme/Lymph

## 2023-02-02 ENCOUNTER — APPOINTMENT (OUTPATIENT)
Dept: UROLOGY | Facility: CLINIC | Age: 63
End: 2023-02-02
Payer: COMMERCIAL

## 2023-02-02 VITALS
WEIGHT: 190 LBS | SYSTOLIC BLOOD PRESSURE: 122 MMHG | OXYGEN SATURATION: 96 % | HEIGHT: 69 IN | HEART RATE: 77 BPM | BODY MASS INDEX: 28.14 KG/M2 | DIASTOLIC BLOOD PRESSURE: 85 MMHG

## 2023-02-02 DIAGNOSIS — R53.1 WEAKNESS: ICD-10-CM

## 2023-02-02 DIAGNOSIS — R30.9 PAINFUL MICTURITION, UNSPECIFIED: ICD-10-CM

## 2023-02-02 PROCEDURE — 99204 OFFICE O/P NEW MOD 45 MIN: CPT

## 2023-02-02 NOTE — HISTORY OF PRESENT ILLNESS
[FreeTextEntry1] : 62 year old male presents for Microhematuria. \par Recently had urine test and was told has microscopic hematuria. \par Denies gross hematuria, no history of kidney stones or recurrent urinary tract infections. \par Father and Paternal aunt had kidney stone. \par Past smoker. \par Reports reasonable stream, urinates 4 to 5 x or so during the day. No nocturia. \par Denies hesitancy, straining, intermittency, urgency, incontinence, sense of incomplete emptying.\par Denies dysuria, lower abdominal or flank pain, fever, chills or rigors.\par Not sexually active. Has Erectile dysfunction. \par No family history of Prostate cancer. \par

## 2023-02-02 NOTE — LETTER BODY
[Dear  ___] : Dear  [unfilled], [Consult Letter:] : I had the pleasure of evaluating your patient, [unfilled]. [( Thank you for referring [unfilled] for consultation for _____ )] : Thank you for referring [unfilled] for consultation for [unfilled] [Please see my note below.] : Please see my note below. [Consult Closing:] : Thank you very much for allowing me to participate in the care of this patient.  If you have any questions, please do not hesitate to contact me. [Sincerely,] : Sincerely, [FreeTextEntry3] : Manuel Wilkinson MD\par  of Urology\par [facility]\par \par [facility]\par Offices:\par [address], [facility]\par [address] [address]\par [address] [facility], [address]\par \par TEL: [phone]\par FAX: [phone]

## 2023-02-02 NOTE — PHYSICAL EXAM
[Urethral Meatus] : meatus normal [Penis Abnormality] : normal circumcised penis [Scrotum] : the scrotum was normal [Epididymis] : the epididymides were normal [Testes Tenderness] : no tenderness of the testes [Testes Mass (___cm)] : there were no testicular masses [Prostate Tenderness] : the prostate was not tender [No Prostate Nodules] : no prostate nodules [Prostate Size ___ (0-4)] : prostate size [unfilled] (scale: 0-4) [Normal Appearance] : normal appearance [General Appearance - In No Acute Distress] : no acute distress [FreeTextEntry1] : normal peripheral circulation  [] : no respiratory distress [Abdomen Soft] : soft [Abdomen Tenderness] : non-tender [Normal Station and Gait] : the gait and station were normal for the patient's age [Skin Color & Pigmentation] : normal skin color and pigmentation [No Focal Deficits] : no focal deficits [Oriented To Time, Place, And Person] : oriented to person, place, and time

## 2023-02-02 NOTE — ASSESSMENT
[FreeTextEntry1] : Microhematuria:\par Discussed the differential diagnosis of hematuria including benign and malignant pathology- including but not limited to nephrolithiasis, bladder stone, urinary tract infection, glomerular disease, renal cancer, bladder cancer, prostate cancer. We also discussed the chance that workup will not reveal a source for the bleeding. The patient understands that the hematuria could be from an upper tract (kidney or ureter) or lower tract (bladder, urethra, prostate) and that workup includes imaging and direct visualization of all of these.\par \par Will proceed with work up with Urinalysis with microscopy, Urine culture, Urine cytology, CT Urogram. \par Patient hesitant to undergo Cystoscopy.\par \par Return to office after CT scan.

## 2023-02-03 LAB — URINE CYTOLOGY: NORMAL

## 2023-02-07 ENCOUNTER — RESULT REVIEW (OUTPATIENT)
Age: 63
End: 2023-02-07

## 2023-02-08 LAB
APPEARANCE: ABNORMAL
BACTERIA UR CULT: NORMAL
BACTERIA: NEGATIVE
BILIRUBIN URINE: NEGATIVE
BLOOD URINE: ABNORMAL
CALCIUM OXALATE CRYSTALS: ABNORMAL
COLOR: YELLOW
GLUCOSE QUALITATIVE U: NEGATIVE
HYALINE CASTS: 0 /LPF
KETONES URINE: NEGATIVE
LEUKOCYTE ESTERASE URINE: NEGATIVE
MICROSCOPIC-UA: NORMAL
NITRITE URINE: NEGATIVE
PH URINE: 6
PROTEIN URINE: ABNORMAL
RED BLOOD CELLS URINE: 67 /HPF
SPECIFIC GRAVITY URINE: 1.03
SQUAMOUS EPITHELIAL CELLS: 4 /HPF
URINE COMMENTS: NORMAL
UROBILINOGEN URINE: NORMAL
WHITE BLOOD CELLS URINE: 7 /HPF

## 2023-02-09 ENCOUNTER — APPOINTMENT (OUTPATIENT)
Dept: CT IMAGING | Facility: CLINIC | Age: 63
End: 2023-02-09

## 2023-02-16 ENCOUNTER — APPOINTMENT (OUTPATIENT)
Dept: UROLOGY | Facility: CLINIC | Age: 63
End: 2023-02-16

## 2023-04-19 ENCOUNTER — NON-APPOINTMENT (OUTPATIENT)
Age: 63
End: 2023-04-19

## 2023-04-19 ENCOUNTER — APPOINTMENT (OUTPATIENT)
Dept: INTERNAL MEDICINE | Facility: CLINIC | Age: 63
End: 2023-04-19
Payer: COMMERCIAL

## 2023-04-19 VITALS
WEIGHT: 166 LBS | HEART RATE: 72 BPM | TEMPERATURE: 97.8 F | BODY MASS INDEX: 24.59 KG/M2 | OXYGEN SATURATION: 98 % | DIASTOLIC BLOOD PRESSURE: 76 MMHG | HEIGHT: 69 IN | RESPIRATION RATE: 16 BRPM | SYSTOLIC BLOOD PRESSURE: 124 MMHG

## 2023-04-19 DIAGNOSIS — F41.9 ANXIETY DISORDER, UNSPECIFIED: ICD-10-CM

## 2023-04-19 DIAGNOSIS — Z01.818 ENCOUNTER FOR OTHER PREPROCEDURAL EXAMINATION: ICD-10-CM

## 2023-04-19 DIAGNOSIS — H33.002 UNSPECIFIED RETINAL DETACHMENT WITH RETINAL BREAK, LEFT EYE: ICD-10-CM

## 2023-04-19 PROCEDURE — 99204 OFFICE O/P NEW MOD 45 MIN: CPT | Mod: 25

## 2023-04-19 PROCEDURE — 93000 ELECTROCARDIOGRAM COMPLETE: CPT | Mod: 59

## 2023-04-19 RX ORDER — CYANOCOBALAMIN 1000 UG/ML
1000 INJECTION INTRAMUSCULAR; SUBCUTANEOUS
Qty: 1 | Refills: 0 | Status: DISCONTINUED | COMMUNITY
Start: 2022-01-12 | End: 2023-04-19

## 2023-04-19 NOTE — ASSESSMENT
[High Risk Surgery - Intraperitoneal, Intrathoracic or Supringuinal Vascular Procedures] : High Risk Surgery - Intraperitoneal, Intrathoracic or Supringuinal Vascular Procedures - No (0) [Ischemic Heart Disease] : Ischemic Heart Disease - No (0) [Congestive Heart Failure] : Congestive Heart Failure - No (0) [Prior Cerebrovascular Accident or TIA] : Prior Cerebrovascular Accident or TIA - No (0) [Creatinine >= 2mg/dL (1 Point)] : Creatinine >= 2mg/dL - No (0) [Insulin-dependent Diabetic (1 Point)] : Insulin-dependent Diabetic - No (0) [0] : 0 , RCRI Class: I, Risk of Post-Op Cardiac Complications: 3.9%, 95% CI for Risk Estimate: 2.8% - 5.4% [Patient Optimized for Surgery] : Patient optimized for surgery [No Further Testing Recommended] : no further testing recommended [FreeTextEntry4] : 62 year old male with history of vitamin B12 deficiency, anxiety who presents today for preop clearance for left eye retinal detachment procedure. He is doing well and has no acute complaints. He has no chest pain, palpitations, SOB, orthopnea, PND, LE edema. He has good functional capacity. His EKG today is sinus rhythm, low voltage with some artifact due to hair on his chest, but no signs of ST or T wave abnormalities. He had a recent workup with his cardiologist Dr. Subramanian last month that was normal. He is medically optimizied for planned procedure.

## 2023-04-19 NOTE — HISTORY OF PRESENT ILLNESS
[No Pertinent Cardiac History] : no history of aortic stenosis, atrial fibrillation, coronary artery disease, recent myocardial infarction, or implantable device/pacemaker [No Pertinent Pulmonary History] : no history of asthma, COPD, sleep apnea, or smoking [No Adverse Anesthesia Reaction] : no adverse anesthesia reaction in self or family member [(Patient denies any chest pain, claudication, dyspnea on exertion, orthopnea, palpitations or syncope)] : Patient denies any chest pain, claudication, dyspnea on exertion, orthopnea, palpitations or syncope [Moderate (4-6 METs)] : Moderate (4-6 METs) [Chronic Anticoagulation] : no chronic anticoagulation [Chronic Kidney Disease] : no chronic kidney disease [Diabetes] : no diabetes [FreeTextEntry1] : left eye retinal detachment repair  [FreeTextEntry2] : 04/25/23 [FreeTextEntry3] : Dr. Ambrocio Rowell  [FreeTextEntry4] : 62 year old male with history of vitamin B12 deficiency, anxiety who presents today for preop clearance for left eye retinal detachment procedure. He is doing well and has no acute complaints.

## 2023-05-03 ENCOUNTER — APPOINTMENT (OUTPATIENT)
Dept: CT IMAGING | Facility: CLINIC | Age: 63
End: 2023-05-03
Payer: COMMERCIAL

## 2023-05-03 ENCOUNTER — NON-APPOINTMENT (OUTPATIENT)
Age: 63
End: 2023-05-03

## 2023-05-03 ENCOUNTER — OUTPATIENT (OUTPATIENT)
Dept: OUTPATIENT SERVICES | Facility: HOSPITAL | Age: 63
LOS: 1 days | End: 2023-05-03
Payer: COMMERCIAL

## 2023-05-03 DIAGNOSIS — R31.29 OTHER MICROSCOPIC HEMATURIA: ICD-10-CM

## 2023-05-03 PROCEDURE — 74178 CT ABD&PLV WO CNTR FLWD CNTR: CPT

## 2023-05-03 PROCEDURE — 74178 CT ABD&PLV WO CNTR FLWD CNTR: CPT | Mod: 26

## 2023-05-09 ENCOUNTER — TRANSCRIPTION ENCOUNTER (OUTPATIENT)
Age: 63
End: 2023-05-09

## 2023-05-11 ENCOUNTER — INPATIENT (INPATIENT)
Facility: HOSPITAL | Age: 63
LOS: 1 days | Discharge: HOME | End: 2023-05-13
Attending: HOSPITALIST | Admitting: HOSPITALIST
Payer: COMMERCIAL

## 2023-05-11 ENCOUNTER — OFFICE (OUTPATIENT)
Dept: URBAN - METROPOLITAN AREA CLINIC 77 | Facility: CLINIC | Age: 63
Setting detail: OPHTHALMOLOGY
End: 2023-05-11
Payer: COMMERCIAL

## 2023-05-11 VITALS
SYSTOLIC BLOOD PRESSURE: 127 MMHG | OXYGEN SATURATION: 100 % | HEART RATE: 77 BPM | RESPIRATION RATE: 16 BRPM | DIASTOLIC BLOOD PRESSURE: 75 MMHG | TEMPERATURE: 98 F

## 2023-05-11 DIAGNOSIS — H33.311: ICD-10-CM

## 2023-05-11 DIAGNOSIS — H33.012: ICD-10-CM

## 2023-05-11 DIAGNOSIS — H02.032: ICD-10-CM

## 2023-05-11 DIAGNOSIS — Z96.1: ICD-10-CM

## 2023-05-11 DIAGNOSIS — H31.001: ICD-10-CM

## 2023-05-11 DIAGNOSIS — H33.22 SEROUS RETINAL DETACHMENT, LEFT EYE: ICD-10-CM

## 2023-05-11 DIAGNOSIS — H47.10: ICD-10-CM

## 2023-05-11 DIAGNOSIS — H35.22: ICD-10-CM

## 2023-05-11 DIAGNOSIS — H02.035: ICD-10-CM

## 2023-05-11 LAB
ALBUMIN SERPL ELPH-MCNC: 4.6 G/DL — SIGNIFICANT CHANGE UP (ref 3.3–5)
ALP SERPL-CCNC: 86 U/L — SIGNIFICANT CHANGE UP (ref 40–120)
ALT FLD-CCNC: 7 U/L — SIGNIFICANT CHANGE UP (ref 4–41)
ANION GAP SERPL CALC-SCNC: 13 MMOL/L — SIGNIFICANT CHANGE UP (ref 7–14)
APTT BLD: 31.9 SEC — SIGNIFICANT CHANGE UP (ref 27–36.3)
AST SERPL-CCNC: 12 U/L — SIGNIFICANT CHANGE UP (ref 4–40)
BILIRUB SERPL-MCNC: 0.4 MG/DL — SIGNIFICANT CHANGE UP (ref 0.2–1.2)
BUN SERPL-MCNC: 18 MG/DL — SIGNIFICANT CHANGE UP (ref 7–23)
CALCIUM SERPL-MCNC: 9.9 MG/DL — SIGNIFICANT CHANGE UP (ref 8.4–10.5)
CHLORIDE SERPL-SCNC: 100 MMOL/L — SIGNIFICANT CHANGE UP (ref 98–107)
CO2 SERPL-SCNC: 24 MMOL/L — SIGNIFICANT CHANGE UP (ref 22–31)
CREAT SERPL-MCNC: 0.86 MG/DL — SIGNIFICANT CHANGE UP (ref 0.5–1.3)
EGFR: 98 ML/MIN/1.73M2 — SIGNIFICANT CHANGE UP
GLUCOSE SERPL-MCNC: 113 MG/DL — HIGH (ref 70–99)
HCT VFR BLD CALC: 37.7 % — LOW (ref 39–50)
HGB BLD-MCNC: 12.3 G/DL — LOW (ref 13–17)
INR BLD: 1.11 RATIO — SIGNIFICANT CHANGE UP (ref 0.88–1.16)
MCHC RBC-ENTMCNC: 28.7 PG — SIGNIFICANT CHANGE UP (ref 27–34)
MCHC RBC-ENTMCNC: 32.6 GM/DL — SIGNIFICANT CHANGE UP (ref 32–36)
MCV RBC AUTO: 87.9 FL — SIGNIFICANT CHANGE UP (ref 80–100)
NRBC # BLD: 0 /100 WBCS — SIGNIFICANT CHANGE UP (ref 0–0)
NRBC # FLD: 0 K/UL — SIGNIFICANT CHANGE UP (ref 0–0)
PLATELET # BLD AUTO: 279 K/UL — SIGNIFICANT CHANGE UP (ref 150–400)
POTASSIUM SERPL-MCNC: 4.2 MMOL/L — SIGNIFICANT CHANGE UP (ref 3.5–5.3)
POTASSIUM SERPL-SCNC: 4.2 MMOL/L — SIGNIFICANT CHANGE UP (ref 3.5–5.3)
PROT SERPL-MCNC: 7.3 G/DL — SIGNIFICANT CHANGE UP (ref 6–8.3)
PROTHROM AB SERPL-ACNC: 12.9 SEC — SIGNIFICANT CHANGE UP (ref 10.5–13.4)
RBC # BLD: 4.29 M/UL — SIGNIFICANT CHANGE UP (ref 4.2–5.8)
RBC # FLD: 12.7 % — SIGNIFICANT CHANGE UP (ref 10.3–14.5)
SODIUM SERPL-SCNC: 137 MMOL/L — SIGNIFICANT CHANGE UP (ref 135–145)
WBC # BLD: 6.14 K/UL — SIGNIFICANT CHANGE UP (ref 3.8–10.5)
WBC # FLD AUTO: 6.14 K/UL — SIGNIFICANT CHANGE UP (ref 3.8–10.5)

## 2023-05-11 PROCEDURE — 99215 OFFICE O/P EST HI 40 MIN: CPT | Performed by: OPHTHALMOLOGY

## 2023-05-11 PROCEDURE — 71046 X-RAY EXAM CHEST 2 VIEWS: CPT | Mod: 26

## 2023-05-11 PROCEDURE — 92235 FLUORESCEIN ANGRPH MLTIFRAME: CPT | Performed by: OPHTHALMOLOGY

## 2023-05-11 PROCEDURE — 92083 EXTENDED VISUAL FIELD XM: CPT | Performed by: OPHTHALMOLOGY

## 2023-05-11 PROCEDURE — 76512 OPH US DX B-SCAN: CPT | Mod: 26,50

## 2023-05-11 PROCEDURE — 92250 FUNDUS PHOTOGRAPHY W/I&R: CPT | Performed by: OPHTHALMOLOGY

## 2023-05-11 PROCEDURE — 99285 EMERGENCY DEPT VISIT HI MDM: CPT

## 2023-05-11 PROCEDURE — 76512 OPH US DX B-SCAN: CPT | Performed by: OPHTHALMOLOGY

## 2023-05-11 PROCEDURE — 99223 1ST HOSP IP/OBS HIGH 75: CPT | Mod: GC

## 2023-05-11 ASSESSMENT — KERATOMETRY
OD_K2POWER_DIOPTERS: 44.00
OD_K1POWER_DIOPTERS: 43.50
OS_K1POWER_DIOPTERS: 42.50
OS_AXISANGLE_DEGREES: 045
OD_AXISANGLE_DEGREES: 101
OS_K2POWER_DIOPTERS: 43.75

## 2023-05-11 ASSESSMENT — REFRACTION_AUTOREFRACTION
OD_SPHERE: PLANO
OS_SPHERE: UNABLE
OD_CYLINDER: -0.75
OD_AXIS: 025

## 2023-05-11 ASSESSMENT — LID POSITION - ENTROPION
OD_ENTROPION: 1+
OS_ENTROPION: 1+

## 2023-05-11 ASSESSMENT — REFRACTION_CURRENTRX
OD_VPRISM_DIRECTION: SV
OS_OVR_VA: 20/
OD_SPHERE: +2.50
OD_OVR_VA: 20/

## 2023-05-11 ASSESSMENT — VISUAL ACUITY
OS_BCVA: 20/20
OD_BCVA: 20/LP

## 2023-05-11 NOTE — ED ADULT NURSE REASSESSMENT NOTE - NS ED NURSE REASSESS COMMENT FT1
pt at baseline mental status, A&Ox4 respirations even unlabored completing full sentences. pt resting comfortably in stretcher at this time, offers  no new complaints. stretcher lowest position siderails up safety maintained. pt admitted. report given to [facility] RN, [facility]

## 2023-05-11 NOTE — H&P ADULT - PROBLEM SELECTOR PLAN 4
[facility] records note that pt w/ hx of hypothyroidism, not on home medications  - f/u thyroid studies [facility] records note that pt w/ hx of hypothyroidism, not on home medications  - F/u thyroid function tests

## 2023-05-11 NOTE — CONSULT NOTE ADULT - ASSESSMENT
Patient FELECIA MIRAMONTES is a 61yo M PMHx of nephrolithiasis, vitamin D/B12 deficiency, cataracts, who presents to ED for concern of papilledema, optic nerve swelling. Patient states around 3-4 months ago he had a fall for which he had extensive workup performed including possible CT head or MR head with reportedly unrevealing results. Sees Dr Pulido (internist/ Cardiologist). Then approximately 1 month ago he saw opthalmology for which he was found with reportedly 20/20 vision in R eye and blind in L eye that he did not realize. He had multiple major life events including loss of his father and so he had delayed following up for medical care. He was referred to a video retinal consultant and to also see neuro opthalmology. He saw Dr Fabian Bangura and was found with L eye end stage RD, swollen R optic nerves with delayed filling and late disc leakage (eval for papilledema), R eye retinal scar. He was recommended to visit ED urgently for MR imaging, inflammatory marker eval, opthalmology and neurology evaluation. Of note, patient also after his fall was found to have B12 and wm D deficiencies for which he was treated. Also had some memory difficulties but not really improved after vitamin repletion. No reported history of cancer. Retired .       VS 98.2F, HR77, /75, RR16, 100%RA   cbc pending, coags and CMP unrevealing  POCUS ED: Left macula on retinal detachment. Bilateral vitreal hemorrhage. Bilateral optic nerve sheath diameter enlargement concerning for increased intracranial pressure.    Impression:  INCOMPLETE    Recommendations:  [ ] Please obtain CT head w/o con, CT angio head/neck w/ con, CT venogram head w/ con  [ ] Will decide after CT imaging if warrants antiplatelet, statin agent from neurovascular perspective   [ ] HgbA1C, fasting lipid panel, CBC, CMP, coag panel, troponin, ESR, CRP, B12, folate, TSH, 25OH wm D  [ ] MRI brain w/ and w/o con, MR orbits w/ w/o con  [ ] TTE w/ bubble study if found with stroke  [ ] tele, EKG,   [ ] Opthalmology eval appreciated  [ ] Patient advised to obtain prior head imaging from another health system if able in interim    - Neuro-checks and VS q4h  - Dysphagia screen. If fails, speech/swallow eval  - aspiration, fall precautions    Discussed patient recs above with primary team (ED), patient and in agreement. Discussed risks/benefits of diagnostic options (ie radiation concerns) w/ patient and agreeable. The patient demonstrated good understanding of the treatment plan. Recommendations will be finalized/amended once signed by attending. Patient FELECIA MIRAMONTES is a 63yo M PMHx of nephrolithiasis, vitamin D/B12 deficiency, cataracts, who presents to ED for concern of papilledema, optic nerve swelling. Patient states around 3-4 months ago he had a fall for which he had extensive workup performed including possible CT head or MR head with reportedly unrevealing results. Sees Dr Pulido (internist/ Cardiologist). Then approximately 1 month ago he saw opthalmology for which he was found with reportedly 20/20 vision in R eye and blind in L eye that he did not realize. He had multiple major life events including loss of his father and so he had delayed following up for medical care. He was referred to a video retinal consultant and to also see neuro opthalmology. He saw Dr Fabian Bangura and was found with L eye end stage RD, swollen R optic nerves with delayed filling and late disc leakage (eval for papilledema), R eye retinal scar. He was recommended to visit ED urgently for MR imaging, inflammatory marker eval, opthalmology and neurology evaluation. Of note, patient also after his fall was found to have B12 and wm D deficiencies for which he was treated. Also had some memory difficulties but not really improved after vitamin repletion. No reported history of cancer. Retired .       VS 98.2F, HR77, /75, RR16, 100%RA   cbc pending, coags and CMP unrevealing  POCUS ED: Left macula on retinal detachment. Bilateral vitreal hemorrhage. Bilateral optic nerve sheath diameter enlargement concerning for increased intracranial pressure.    Impression:  Concern for retinal detachment, swollen optic nerve, L eye vision loss, evaluate from neurologic standpoint for intracranial etiologies of papilledema.    Recommendations:  [ ] Please obtain CT head w/o con, CT angio head/neck w/ con, CT venogram head w/ con  [ ] Will decide after CT imaging if warrants antiplatelet, statin agent from neurovascular perspective   [ ] HgbA1C, fasting lipid panel, CBC, CMP, coag panel, troponin, ESR, CRP, B12, folate, TSH, 25OH wm D  [ ] MRI brain w/ and w/o con, MR orbits w/ w/o con  [ ] TTE w/ bubble study if found with stroke  [ ] tele, EKG,   [ ] Opthalmology eval appreciated  [ ] Patient advised to obtain prior head imaging from another health system if able in interim    - Neuro-checks and VS q4h  - Dysphagia screen. If fails, speech/swallow eval  - aspiration, fall precautions    Discussed patient recs above with primary team (ED), patient and in agreement. Discussed recommendation to obtain CT imaging while patient in ED. Discussed risks/benefits of diagnostic options (ie radiation concerns) w/ patient and agreeable. The patient demonstrated good understanding of the treatment plan. Recommendations will be finalized/amended once signed by attending. Patient FELECIA MIRAMONTES is a 63yo M PMHx of nephrolithiasis, vitamin D/B12 deficiency, cataracts, who presents to ED for concern of papilledema, optic nerve swelling. Patient states around 3-4 months ago he had a fall for which he had extensive workup performed including possible CT head or MR head with reportedly unrevealing results. Sees Dr Pulido (internist/ Cardiologist). Then approximately 1 month ago he saw opthalmology for which he was found with reportedly 20/20 vision in R eye and blind in L eye that he did not realize. He had multiple major life events including loss of his father and so he had delayed following up for medical care. He was referred to a video retinal consultant and to also see neuro opthalmology. He saw Dr Fabian Bangura and was found with L eye end stage RD, swollen R optic nerves with delayed filling and late disc leakage (eval for papilledema), R eye retinal scar. He was recommended to visit ED urgently for MR imaging, inflammatory marker eval, opthalmology and neurology evaluation. Of note, patient also after his fall was found to have B12 and wm D deficiencies for which he was treated. Also had some memory difficulties but not really improved after vitamin repletion. No reported history of cancer. Retired .       VS 98.2F, HR77, /75, RR16, 100%RA   cbc pending, coags and CMP unrevealing  POCUS ED: Left macula on retinal detachment. Bilateral vitreal hemorrhage. Bilateral optic nerve sheath diameter enlargement concerning for increased intracranial pressure.    Impression:  Concern for by ophthalmology for retinal detachment, L eye vision loss, R eye swollen optic nerve, evaluate from neurologic standpoint for intracranial etiologies of papilledema.     Recommendations:  [ ] Please obtain CT head w/o con, CT angio head/neck w/ con, CT venogram head w/ con  [ ] Will decide after CT imaging if warrants antiplatelet, statin agent from neurovascular perspective   [ ] HgbA1C, fasting lipid panel, CBC, CMP, coag panel, troponin, ESR, CRP, B12, folate, TSH, 25OH wm D  [ ] MRI brain w/ and w/o con, MR orbits w/ w/o con  [ ] TTE w/ bubble study if found with stroke  [ ] tele, EKG,   [ ] Opthalmology eval appreciated  [ ] Patient advised to obtain prior head imaging from another health system if able in interim    - Neuro-checks and VS q4h  - Dysphagia screen. If fails, speech/swallow eval  - aspiration, fall precautions    Discussed patient recs above with primary team (ED), patient and in agreement. Discussed recommendation to obtain CT imaging while patient in ED. Discussed risks/benefits of diagnostic options (ie radiation concerns) w/ patient and agreeable. The patient demonstrated good understanding of the treatment plan. Recommendations will be finalized/amended once signed by attending.

## 2023-05-11 NOTE — H&P ADULT - PROBLEM SELECTOR PLAN 2
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 Unclear etiology, pt with prior cataract surgery. Per ophthalmology, no findings of increased ICP on their exam.  - Ophthalmology following, appreciate recs  - F/u MRI head and orbits w/wo con, MRA head w/ con, MRV head w/ con  - Consult procedure team or IR for LP if imaging unrevealing

## 2023-05-11 NOTE — CONSULT NOTE ADULT - SUBJECTIVE AND OBJECTIVE BOX
[facility] DEPARTMENT OF OPHTHALMOLOGY - INITIAL ADULT CONSULT  -----------------------------------------------------------------------------------------------------------------  Prateek Dawkins MD  PGY 2  Contact on Teams  -----------------------------------------------------------------------------------------------------------------    HPI:  61yo M PMHx of nephrolithiasis, vitamin D/B12 deficiency, cataracts, who was sent to the hospital by ophthalmalogist for concern of papilledema, optic nerve swelling.     Patient states around 3-4 months ago he had a fall for which he had extensive workup performed including brain imaging w/ reportedly unrevealing results. He was found to have B12 and wm D deficiencies for which he was treated. 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 He was referred to a video retinal consultant and to also see neuro opthalmology. He saw Dr Fabian Bangura today and was found with L eye end stage RD, swollen R optic nerves with delayed filling and late disc leakage (eval for papilledema), R eye retinal scar. He was recommended to visit ED urgently for MR imaging, inflammatory marker eval, opthalmology and neurology evaluation.No reported history of cancer. Retired .     Denies current or recent headaches, nausea, vomiting, dizziness, speech disturbance, word finding difficulty, extremity numbness/weakness, gait difficulty. (11 May 2023 22:33)    Interval History:OPhtho consulted to evaluate for optic nerve swelling. Patient with chronic RD in left eye planned for surgery next tuesday with Dr. Rowell    PAST MEDICAL & SURGICAL HISTORY:  No pertinent past medical history        Past Ocular History: Chronic RD left eye; CE/PCIOL OU  Ophthalmic Medications: None  FAMILY HISTORY:    MEDICATIONS  (STANDING):    MEDICATIONS  (PRN):    Allergies & Intolerances:   No Known Allergies    Review of Systems:  Constitutional: No fever, chills  Eyes: No blurry vision, flashes, floaters, FBS, erythema, discharge, double vision, OD  Neuro: No tremors  Cardiovascular: No chest pain, palpitations  Respiratory: No SOB, no cough  GI: No nausea, vomiting, abdominal pain  : No dysuria  Skin: no rash  Psych: no depression  Endocrine: no polyuria, polydipsia  Heme/lymph: no swelling    VITALS: T(C): 36.8 (05-11-23 @ 23:37)  T(F): 98.2 (05-11-23 @ 23:37), Max: 98.2 (05-11-23 @ 16:14)  HR: 55 (05-11-23 @ 23:37) (55 - 77)  BP: 118/72 (05-11-23 @ 23:37) (118/72 - 127/75)  RR:  (16 - 17)  SpO2:  (100% - 100%)  Wt(kg): --  General: AAO x 3, appropriate mood and affect    Ophthalmology Exam:  Visual acuity (cc): 20/20 OD; LP OS  Pupils: APD OS  Ttono: 15 OU  Extraocular movements (EOMs): Full OU, no pain, no diplopia  Confrontational Visual Field (CVF): Full OD, ANNIE OS due to VA    Pen Light Exam (PLE)  External: Flat OU  Lids/Lashes/Lacrimal Ducts: Flat OU    Sclera/Conjunctiva: W+Q OU  Cornea: Cl OU  Anterior Chamber: D+F OU    Iris: Flat OU  Lens: PCIOL OU    Fundus Exam: dilated with 1% tropicamide and 2.5% phenylephrine  Approval obtained from primary team for dilation  Patient aware that pupils can remained dilated for at least 4-6 hours  Exam performed with 20D lens    Vitreous: wnl OU  Disc, cup/disc: blurring of disc margin  Macula: wnl OU  Vessels: wnl OU  Periphery: wnl OU    NO view OS due to chronic retinal detachment

## 2023-05-11 NOTE — ED ADULT NURSE NOTE - NSFALLUNIVINTERV_ED_ALL_ED
Bed/Stretcher in lowest position, wheels locked, appropriate side rails in place/Call bell, personal items and telephone in reach/Instruct patient to call for assistance before getting out of bed/chair/stretcher/Non-slip footwear applied when patient is off stretcher/[place] to call system/Physically safe environment - no spills, clutter or unnecessary equipment/Purposeful proactive rounding/Room/bathroom lighting operational, light cord in reach

## 2023-05-11 NOTE — H&P ADULT - NSHPPHYSICALEXAM_GEN_ALL_CORE
.  VITAL SIGNS:  T(C): 36.8 (05-11-23 @ 16:14), Max: 36.8 (05-11-23 @ 16:14)  T(F): 98.2 (05-11-23 @ 16:14), Max: 98.2 (05-11-23 @ 16:14)  HR: 77 (05-11-23 @ 16:14) (77 - 77)  BP: 127/75 (05-11-23 @ 16:14) (127/75 - 127/75)  BP(mean): --  RR: 16 (05-11-23 @ 16:14) (16 - 16)  SpO2: 100% (05-11-23 @ 16:14) (100% - 100%)  Wt(kg): --    PHYSICAL EXAM:    Constitutional: WDWN resting comfortably in bed; NAD  Head: NC/AT  Eyes: PERRL, EOMI, anicteric sclera  ENT: no nasal discharge; uvula midline, no oropharyngeal erythema or exudates; MMM  Neck: supple; no JVD or thyromegaly  Respiratory: CTA B/L; no W/R/R, no retractions  Cardiac: +S1/S2; RRR; no M/R/G; PMI non-displaced  Gastrointestinal: soft, NT/ND; no rebound or guarding; +BSx4  Genitourinary: normal external genitalia  Back: spine midline, no bony tenderness or step-offs; no CVAT B/L  Extremities: WWP, no clubbing or cyanosis; no peripheral edema. Capillary refill <2 sec  Musculoskeletal: NROM x4; no joint swelling, tenderness or erythema  Vascular: 2+ radial, femoral, DP/PT pulses B/L  Dermatologic: skin warm, dry and intact; no rashes, wounds, or scars  Lymphatic: no submandibular or cervical LAD  Neurologic: AAOx3; CNII-XII grossly intact; no focal deficits  Psychiatric: affect and characteristics of appearance, verbalizations, behaviors are appropriate .  VITAL SIGNS:  T(C): 36.8 (05-11-23 @ 16:14), Max: 36.8 (05-11-23 @ 16:14)  T(F): 98.2 (05-11-23 @ 16:14), Max: 98.2 (05-11-23 @ 16:14)  HR: 77 (05-11-23 @ 16:14) (77 - 77)  BP: 127/75 (05-11-23 @ 16:14) (127/75 - 127/75)  BP(mean): --  RR: 16 (05-11-23 @ 16:14) (16 - 16)  SpO2: 100% (05-11-23 @ 16:14) (100% - 100%)  Wt(kg): --    PHYSICAL EXAM:    Constitutional: WDWN resting comfortably in bed; NAD  Head: NC/AT  Eyes: saccadic eye movement, pupils not reactive but recent dilating eye exam by ophtho resident  ENT: no nasal discharge; MMM  Neck: supple; no JVD   Respiratory: CTA B/L; no W/R/R  Cardiac: +S1/S2; RRR; no M/R/G  Gastrointestinal: soft, NT/ND; no rebound or guarding;  Extremities: WWP, no clubbing or cyanosis; no peripheral edema. Capillary refill <2 sec  Musculoskeletal: NROM x4; no joint swelling, tenderness or erythema  Vascular: 2+ radial, DP/PT pulses B/L  Dermatologic: skin warm, dry and intact  Lymphatic: no submandibular or cervical LAD  Neurologic: AAOx3; no focal deficits  Psychiatric: affect and characteristics of appearance, verbalizations, behaviors are appropriate .  VITAL SIGNS:  T(C): 36.8 (05-11-23 @ 16:14), Max: 36.8 (05-11-23 @ 16:14)  T(F): 98.2 (05-11-23 @ 16:14), Max: 98.2 (05-11-23 @ 16:14)  HR: 77 (05-11-23 @ 16:14) (77 - 77)  BP: 127/75 (05-11-23 @ 16:14) (127/75 - 127/75)  BP(mean): --  RR: 16 (05-11-23 @ 16:14) (16 - 16)  SpO2: 100% (05-11-23 @ 16:14) (100% - 100%)  Wt(kg): --    PHYSICAL EXAM:    Constitutional: WDWN resting comfortably in bed; NAD  Head: NC/AT  Eyes: saccadic eye movement, pupils dilated not reactive but recent dilating eye exam performed by ophtho resident  ENT: no nasal discharge; MMM  Neck: supple; no JVD   Respiratory: CTA B/L; no W/R/R  Cardiac: +S1/S2; RRR; no M/R/G  Gastrointestinal: soft, NT/ND; no rebound or guarding;  Extremities: WWP, no clubbing or cyanosis; no peripheral edema. Capillary refill <2 sec  Musculoskeletal: NROM x4; no joint swelling, tenderness or erythema  Vascular: 2+ radial, DP/PT pulses B/L  Dermatologic: skin warm, dry and intact  Lymphatic: no submandibular or cervical LAD  Neurologic: AAOx3; deficits in all visual fields in left eye  Psychiatric: affect and characteristics of appearance, verbalizations, behaviors are appropriate VITAL SIGNS:  T(C): 36.8 (05-11-23 @ 16:14), Max: 36.8 (05-11-23 @ 16:14)  T(F): 98.2 (05-11-23 @ 16:14), Max: 98.2 (05-11-23 @ 16:14)  HR: 77 (05-11-23 @ 16:14) (77 - 77)  BP: 127/75 (05-11-23 @ 16:14) (127/75 - 127/75)  BP(mean): --  RR: 16 (05-11-23 @ 16:14) (16 - 16)  SpO2: 100% (05-11-23 @ 16:14) (100% - 100%)  Wt(kg): --    PHYSICAL EXAM:    Constitutional: WDWN resting comfortably in bed; NAD  Head: NC/AT  Eyes: saccadic eye movement, pupils dilated not reactive but recent dilating eye exam performed by ophtho resident.  Mouth: MMM, no oropharyngeal exudates  Neck: supple; no JVD   Respiratory: unlabored respirations, good inspiratory effort; CTA B/L; no W/R/R  Cardiac: +S1/S2; RRR; no M/R/G; no LE edema b/l  Gastrointestinal: soft, NT/ND; no rebound or guarding;  Extremities: WWP, no clubbing or cyanosis; capillary refill <2 sec  Musculoskeletal: NROM x4; no joint swelling, tenderness or erythema  Vascular: 2+ radial, DP/PT pulses b/l  Dermatologic: skin warm, dry and intact  Lymphatic: no submandibular or cervical LAD  Neurologic: AAOx3; deficits in all visual fields in left eye; 5/5 motor strength and intact tactile strength in all extremities b/l  Psychiatric: affect and characteristics of appearance, verbalizations, behaviors are appropriate

## 2023-05-11 NOTE — CONSULT NOTE ADULT - ASSESSMENT
Assessment and Recommendations:  62y male with a past medical history/ocular history of Chronic RD OD consulted for optic nerve swelling OD, found to have optic nerve swelling OD. Patient with vision 20/20 right eye, Light perception (LP) left eye, Afferent pupillary defect (APD) left eye, intraocular pressure within normal limits, extraocular movements full in both eyes and confrontational visual fields full in right eye. Dialted exam shows chronic RD in left eye and optic nerve swelling right eye.    #Optic nerve swelling OD   - Unable to assess OS due to chronic RD   - No symptoms of increased ICP on exam, however will need workup to exclude   - Recc MRI/MTRV brain and orbits   - Recc LP with opening pressure if imaging above in unrevealing   - Appreciate Neuro reccs       #Chronic RD OS   - Patient scheduled for surgery this tuesday with Dr. Ambrocio Rowell.    - No further treatment inpatient.       Outpatient Follow-up: Patient should follow-up with his/her ophthalmologist or with [facility] Department of Ophthalmology within 1 week of after discharge at:    [address]  [place] [address]  [phone]    Prateek Dawkins MD, PGY-2  Also available on [employer] Teams

## 2023-05-11 NOTE — ED ADULT NURSE NOTE - OBJECTIVE STATEMENT
Patient is A&OX4, awake and alert. Pt coming to ED from opthalmology for vision changes to left eye. Pt states he was advised to come to the ED for further eval. No neuro deficits at this time. PMH of retinal detachment. lung sounds clear BL, respirations are even and unlabored. heart tones audible and regular. bed lowest position, will continue to monitor. Patient is A&OX4, awake and alert. Pt coming to ED from opthalmology for vision changes to left eye. Pt states he was advised to come to the ED for further eval. No neuro deficits at this time. PMH of retinal detachment. lung sounds clear BL, respirations are even and unlabored. heart tones audible and regular. 20G IV placed to RAC. bed lowest position, will continue to monitor.

## 2023-05-11 NOTE — H&P ADULT - ATTENDING COMMENTS
61yo M with PMHx of nephrolithiasis, vitamin D/B12 deficiency, cataracts, who was sent to the hospital by ophthalmologist due to concern for R-sided papilledema and optic nerve swelling. Evaluated by both neurology and ophthalmology. Unclear etiology. F/u CTH noncon, CTA H/N con, CT venogram head con, MRI head and orbits w/wo con, MRA head w/ con, MRV head w/ con. 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 Monitor with q4hr neuro checks for now.

## 2023-05-11 NOTE — CONSULT NOTE ADULT - ATTENDING COMMENTS
No headache. Left eye visual loss.  Other neuro ROS negative.   Exam:  Visual acuity: OD 20/25; OS LP only.  Pupils - mydriatics given.  VFF to CF on the right.   Fundus - R papilledema.     < from: CT Venogram Brain w/ IV Cont (05.12.23 @ 02:11) >    IMPRESSION:  Brain CT: No intracranial hemorrhage. No evidence of acute stroke. No   space-occupying lesion. Small amount of hemorrhage along the margin of   the left posterior globe consistent with patient's history of recent   retinal detachment  CTA: No evidence of intracranial vascular abnormality. No stenosis. No   AVM. No aneurysm  CTV: No evidence of sinus thrombosis.      < end of copied text >        A/P  Mr. Alexander is a 63 yo man with right papilledema - r/o CNS cause.   Work up per neuro-ophthalmology:  MRI brain and orbits w/wo.  LP  Thank you

## 2023-05-11 NOTE — CONSULT NOTE ADULT - SUBJECTIVE AND OBJECTIVE BOX
Neurology Consultation     HPI: Patient  FELECIA MIRAMONTES is a 61yo M PMHx of nephrolithiasis, vitamin D/B12 deficiency, cataracts, who presents to ED for concern of papilledema, optic nerve swelling. Patient states around 3-4 months ago he had a fall for which he had extensive workup performed including possible CT head or MR head with reportedly unrevealing results. Sees Dr Pulido (internist/ Cardiologist). Then approximately 1 month ago he saw opthalmology for which he was found with reportedly 20/20 vision in R eye and blind in L eye that he did not realize. He had multiple major life events including loss of his father and so he had delayed following up for medical care. He was referred to a video retinal consultant and to also see neuro opthalmology. He saw Dr Fabian Bangura and was found with L eye end stage RD, swollen R optic nerves with delayed filling and late disc leakage (eval for papilledema), R eye retinal scar. He was recommended to visit ED urgently for MR imaging, inflammatory marker eval, opthalmology and neurology evaluation. Of note, patient also after his fall was found to have B12 and wm D deficiencies for which he was treated. Also had some memory difficulties but not really improved after vitamin repletion. No reported history of cancer. Retired .     ROS: Denies current or recent headaches, nausea, vomiting, dizziness, speech disturbance, word finding difficulty, extremity numbness/weakness, gait difficulty.     NIHSS:      PAST MEDICAL & SURGICAL HISTORY:  see above     FAMILY HISTORY:     MEDICATIONS   MEDICATIONS  (STANDING):    MEDICATIONS  (PRN):    ALLERGIES/INTOLERANCES:  Allergies  No Known Allergies    Intolerances    VITALS & EXAMINATION:  Vital Signs Last 24 Hrs  T(C): 36.8 (11 May 2023 16:14), Max: 36.8 (11 May 2023 16:14)  T(F): 98.2 (11 May 2023 16:14), Max: 98.2 (11 May 2023 16:14)  HR: 77 (11 May 2023 16:14) (77 - 77)  BP: 127/75 (11 May 2023 16:14) (127/75 - 127/75)  BP(mean): --  RR: 16 (11 May 2023 16:14) (16 - 16)  SpO2: 100% (11 May 2023 16:14) (100% - 100%)    Parameters below as of 11 May 2023 16:14  Patient On (Oxygen Delivery Method): room air    General:  Constitutional: Male, appears stated age, nontoxic, not in distress, comfortable appearing   Head: Normocephalic;   Eyes: clear sclera;   Extremities: No cyanosis;   Resp: breathing comfortably     Neurological (>12):  MS: Awake, alert.  Oriented person place situation. Follows commands. Attends to examiner, able to provide linear history.   Language: Speech is clear, fluent, good repetition,  comprehension, registration of words.  CNs: pupils equal round and dilated by opthalmology (R 6mm, L 6mm). Blind in L eye, cannot see fingers. R eye VFF. EOMI no disconjugate gaze, skew.       LABORATORY:  CBC   Chem 05-11    137  |  100  |  18  ----------------------------<  113<H>  4.2   |  24  |  0.86    Ca    9.9      11 May 2023 18:55    TPro  7.3  /  Alb  4.6  /  TBili  0.4  /  DBili  x   /  AST  12  /  ALT  7   /  AlkPhos  86  05-11    LFTs LIVER FUNCTIONS - ( 11 May 2023 18:55 )  Alb: 4.6 g/dL / Pro: 7.3 g/dL / ALK PHOS: 86 U/L / ALT: 7 U/L / AST: 12 U/L / GGT: x           Coagulopathy PT/INR - ( 11 May 2023 18:55 )   PT: 12.9 sec;   INR: 1.11 ratio         PTT - ( 11 May 2023 18:55 )  PTT:31.9 sec  Lipid Panel   A1c   Cardiac enzymes     U/A   CSF  Other    STUDIES & IMAGING: (EEG, CT, MR, U/S, TTE/OTF): Neurology Consultation     HPI: Patient  FELECIA MIRAMONTES is a 63yo M PMHx of nephrolithiasis, vitamin D/B12 deficiency, cataracts, who presents to ED for concern of papilledema, optic nerve swelling. Patient states around 3-4 months ago he had a fall for which he had extensive workup performed including possible CT head or MR head with reportedly unrevealing results. Sees Dr Pulido (internist/ Cardiologist). Then approximately 1 month ago he saw opthalmology for which he was found with reportedly 20/20 vision in R eye and blind in L eye that he did not realize. He had multiple major life events including loss of his father and so he had delayed following up for medical care. He was referred to a video retinal consultant and to also see neuro opthalmology. He saw Dr Fabian Bangura and was found with L eye end stage RD, swollen R optic nerves with delayed filling and late disc leakage (eval for papilledema), R eye retinal scar. He was recommended to visit ED urgently for MR imaging, inflammatory marker eval, opthalmology and neurology evaluation. Of note, patient also after his fall was found to have B12 and wm D deficiencies for which he was treated. Also had some memory difficulties but not really improved after vitamin repletion. No reported history of cancer. Retired .     ROS: Denies current or recent headaches, nausea, vomiting, dizziness, speech disturbance, word finding difficulty, extremity numbness/weakness, gait difficulty.     NIHSS:  0 (although unilaterally blind)  LKW: more than a month ago    PAST MEDICAL & SURGICAL HISTORY:  see above     FAMILY HISTORY:     MEDICATIONS   MEDICATIONS  (STANDING):    MEDICATIONS  (PRN):    ALLERGIES/INTOLERANCES:  Allergies  No Known Allergies    Intolerances    VITALS & EXAMINATION:  Vital Signs Last 24 Hrs  T(C): 36.8 (11 May 2023 16:14), Max: 36.8 (11 May 2023 16:14)  T(F): 98.2 (11 May 2023 16:14), Max: 98.2 (11 May 2023 16:14)  HR: 77 (11 May 2023 16:14) (77 - 77)  BP: 127/75 (11 May 2023 16:14) (127/75 - 127/75)  BP(mean): --  RR: 16 (11 May 2023 16:14) (16 - 16)  SpO2: 100% (11 May 2023 16:14) (100% - 100%)    Parameters below as of 11 May 2023 16:14  Patient On (Oxygen Delivery Method): room air    General:  Constitutional: Male, appears stated age, nontoxic, not in distress, comfortable appearing   Head: Normocephalic;   Eyes: clear sclera;   Extremities: No cyanosis;   Resp: breathing comfortably     Neurological (>12):  MS: Awake, alert.  Oriented person place situation. Follows commands. Attends to examiner, able to provide linear history.   Language: Speech is clear, fluent, good repetition,  comprehension, registration of words.  CNs: pupils equal round and dilated by opthalmology (R 6mm, L 6mm). Blind in L eye, cannot see fingers. R eye VFF. EOMI no disconjugate gaze, skew. V1-3 intact LT, No facial asymmetry b/l. Tongue midline.     Motor - Normal bulk and tone throughout. No pronator drift.    L/R         Deltoid  5/5    Biceps   5/5      Triceps  5/5         Wrist Extension 5/5   Wrist Flexion  5/5      5/5   L/R         Hip Flexion  5/5    Hip Extension  5/5  Knee Extension  5/5  Dorsiflexion  5/5      Plantar Flexion 5/5     Sensation: Intact to LT b/l x4 extremities. Cortical: Extinction on DSS (neglect): none  Reflexes L/R:  Biceps(C5) 3/3  BR(C6) 3/3   Triceps(C7)  3/3 Patellar(L4)  3/3  Ankle 2/2  Toes: downgoing bilaterally  No ankle clonus  Neg nash  Pos pectoral reflex bilaterally  Coordination: No dysmetria to FTN b/l UE     LABORATORY:  CBC   Chem 05-11    137  |  100  |  18  ----------------------------<  113<H>  4.2   |  24  |  0.86    Ca    9.9      11 May 2023 18:55    TPro  7.3  /  Alb  4.6  /  TBili  0.4  /  DBili  x   /  AST  12  /  ALT  7   /  AlkPhos  86  05-11    LFTs LIVER FUNCTIONS - ( 11 May 2023 18:55 )  Alb: 4.6 g/dL / Pro: 7.3 g/dL / ALK PHOS: 86 U/L / ALT: 7 U/L / AST: 12 U/L / GGT: x           Coagulopathy PT/INR - ( 11 May 2023 18:55 )   PT: 12.9 sec;   INR: 1.11 ratio         PTT - ( 11 May 2023 18:55 )  PTT:31.9 sec  Lipid Panel   A1c   Cardiac enzymes     U/A   CSF  Other    STUDIES & IMAGING: (EEG, CT, MR, U/S, TTE/OTF): Neurology Consultation     HPI: Patient  FELECIA MIRAMONTES is a 63yo M PMHx of nephrolithiasis, vitamin D/B12 deficiency, cataracts, who presents to ED for concern of papilledema, optic nerve swelling. Patient states around 3-4 months ago he had a fall for which he had extensive workup performed including possible CT head or MR head with reportedly unrevealing results. Sees Dr Pulido (internist/ Cardiologist). Then approximately 1 month ago he saw opthalmology for which he was found with reportedly 20/20 vision in R eye and blind in L eye that he did not realize. He had multiple major life events including loss of his father and so he had delayed following up for medical care. He was referred to a video retinal consultant and to also see neuro opthalmology. He saw Dr Fabian Bangura and was found with L eye end stage RD, swollen R optic nerves with delayed filling and late disc leakage (eval for papilledema), R eye retinal scar. He was recommended to visit ED urgently for MR imaging, inflammatory marker eval, opthalmology and neurology evaluation. Of note, patient also after his fall was found to have B12 and wm D deficiencies for which he was treated. Also had some memory difficulties but not really improved after vitamin repletion. No reported history of cancer. Retired .     ROS: Denies current or recent headaches, nausea, vomiting, dizziness, speech disturbance, word finding difficulty, extremity numbness/weakness, gait difficulty, fever, chills, jaw claudication.     NIHSS:  0 (although unilaterally blind)  LKW: more than a month ago    PAST MEDICAL & SURGICAL HISTORY:  see above     FAMILY HISTORY:     MEDICATIONS   MEDICATIONS  (STANDING):    MEDICATIONS  (PRN):    ALLERGIES/INTOLERANCES:  Allergies  No Known Allergies    Intolerances    VITALS & EXAMINATION:  Vital Signs Last 24 Hrs  T(C): 36.8 (11 May 2023 16:14), Max: 36.8 (11 May 2023 16:14)  T(F): 98.2 (11 May 2023 16:14), Max: 98.2 (11 May 2023 16:14)  HR: 77 (11 May 2023 16:14) (77 - 77)  BP: 127/75 (11 May 2023 16:14) (127/75 - 127/75)  BP(mean): --  RR: 16 (11 May 2023 16:14) (16 - 16)  SpO2: 100% (11 May 2023 16:14) (100% - 100%)    Parameters below as of 11 May 2023 16:14  Patient On (Oxygen Delivery Method): room air    General:  Constitutional: Male, appears stated age, nontoxic, not in distress, comfortable appearing   Head: Normocephalic;   Eyes: clear sclera;   Extremities: No cyanosis;   Resp: breathing comfortably   Very good temporal artery pulses appreciated bilaterally     Neurological (>12):  MS: Awake, alert.  Oriented person place situation. Follows commands. Attends to examiner, able to provide linear history.   Language: Speech is clear, fluent, good repetition,  comprehension, registration of words.  CNs: pupils equal round and dilated by opthalmology (R 6mm, L 6mm). Blind in L eye, cannot see fingers. R eye VFF. EOMI no disconjugate gaze, skew. V1-3 intact LT, No facial asymmetry b/l. Tongue midline.     Motor - Normal bulk and tone throughout. No pronator drift.    L/R         Deltoid  5/5    Biceps   5/5      Triceps  5/5         Wrist Extension 5/5   Wrist Flexion  5/5      5/5   L/R         Hip Flexion  5/5    Hip Extension  5/5  Knee Extension  5/5  Dorsiflexion  5/5      Plantar Flexion 5/5     Sensation: Intact to LT b/l x4 extremities. Cortical: Extinction on DSS (neglect): none  Reflexes L/R:  Biceps(C5) 3/3  BR(C6) 3/3   Triceps(C7)  3/3 Patellar(L4)  3/3  Ankle 2/2  Toes: downgoing bilaterally  No ankle clonus  Neg nash  Pos pectoral reflex bilaterally  Coordination: No dysmetria to FTN b/l UE     LABORATORY:  CBC   Chem 05-11    137  |  100  |  18  ----------------------------<  113<H>  4.2   |  24  |  0.86    Ca    9.9      11 May 2023 18:55    TPro  7.3  /  Alb  4.6  /  TBili  0.4  /  DBili  x   /  AST  12  /  ALT  7   /  AlkPhos  86  05-11    LFTs LIVER FUNCTIONS - ( 11 May 2023 18:55 )  Alb: 4.6 g/dL / Pro: 7.3 g/dL / ALK PHOS: 86 U/L / ALT: 7 U/L / AST: 12 U/L / GGT: x           Coagulopathy PT/INR - ( 11 May 2023 18:55 )   PT: 12.9 sec;   INR: 1.11 ratio         PTT - ( 11 May 2023 18:55 )  PTT:31.9 sec  Lipid Panel   A1c   Cardiac enzymes     U/A   CSF  Other    STUDIES & IMAGING: (EEG, CT, MR, U/S, TTE/OTF):

## 2023-05-11 NOTE — H&P ADULT - NSHPLABSRESULTS_GEN_ALL_CORE
.  LABS:     05-11    137  |  100  |  18  ----------------------------<  113<H>  4.2   |  24  |  0.86    Ca    9.9      11 May 2023 18:55    TPro  7.3  /  Alb  4.6  /  TBili  0.4  /  DBili  x   /  AST  12  /  ALT  7   /  AlkPhos  86  05-11    PT/INR - ( 11 May 2023 18:55 )   PT: 12.9 sec;   INR: 1.11 ratio         PTT - ( 11 May 2023 18:55 )  PTT:31.9 sec              RADIOLOGY, EKG & ADDITIONAL TESTS: Reviewed. .  LABS:     05-11    137  |  100  |  18  ----------------------------<  113<H>  4.2   |  24  |  0.86    Ca    9.9      11 May 2023 18:55    TPro  7.3  /  Alb  4.6  /  TBili  0.4  /  DBili  x   /  AST  12  /  ALT  7   /  AlkPhos  86  05-11    PT/INR - ( 11 May 2023 18:55 )   PT: 12.9 sec;   INR: 1.11 ratio         PTT - ( 11 May 2023 18:55 )  PTT:31.9 sec              RADIOLOGY, EKG & ADDITIONAL TESTS: Reviewed.    < from: POCUS ED Ophthalmic B-Scan, Bilat (05.11.23 @ 18:15) >    IMPRESSION: Left macula on retinal detachment.  Bilateral vitreal hemorrhage.  Bilateral optic nerve sheath diameter enlargement concerning for  increased intracranial pressure.    Emergent Ophthalmology consult as well as CT/MRI imaging recommended.    < end of copied text > Labs personally reviewed.    05-11    137  |  100  |  18  ----------------------------<  113<H>  4.2   |  24  |  0.86    Ca    9.9      11 May 2023 18:55    TPro  7.3  /  Alb  4.6  /  TBili  0.4  /  DBili  x   /  AST  12  /  ALT  7   /  AlkPhos  86  05-11    PT/INR - ( 11 May 2023 18:55 )   PT: 12.9 sec;   INR: 1.11 ratio      PTT - ( 11 May 2023 18:55 )  PTT:31.9 sec    Imaging personally reviewed.  ACC: [id number] EXAM:  XR CHEST PA LAT 2V   ORDERED BY: MANJINDER MENDOZA   PROCEDURE DATE:  05/11/2023    INTERPRETATION:  EXAMINATION: XR CHEST PA AND LATERAL  CLINICAL INDICATION: Weight loss, evaluate for lung mass  TECHNIQUE: 2 views; Frontal and lateral views of the chest were obtained.  COMPARISON: None.    FINDINGS:  The heart is normal in size.  The lungs are clear.  There is no pneumothorax or pleural effusion.  There are no acute osseous abnormalities. Degenerative changes of the   spine.    IMPRESSION:  Clear lungs.    EXAM:  ER US OPTH B SCAN BI    ORDER COMMENTS:    PROCEDURE DATE:  05/11/2023    FOCUSED ED ULTRASOUND REPORT    INTERPRETATION:  Focused ED ultrasound of bilateral eyes :  Indication: Left eye vision loss with concerns for papilledema outpatient.  Findings:  Anterior and posterior chambers of the eye as well as the lens visualized    Right eye: Posterior chamber with heterogeneous hyperechoic foci   concerning for vitreal hemorrhage.  The right optic nerve sheath diameter   measured 0.63 cm.  No evidence of vitreal or retinal detachment.  Left eye: Large retinal detachment with appearance of preserved macula   attachment.  Hyperechoic foci concerning for vitreal hemorrhage.Optic   nerve sheath diameter measured 0.57 cm.    IMPRESSION: Left macula on retinal detachment.  Bilateral vitreal hemorrhage.  Bilateral optic nerve sheath diameter enlargement concerning for  increased intracranial pressure.

## 2023-05-11 NOTE — H&P ADULT - NSHPSOCIALHISTORY_GEN_ALL_CORE
Retired  denies smoking or ETOH use Former smoker. No current use of alcohol, tobacco, or illicit drug use.  Retired, worked as a Special .

## 2023-05-11 NOTE — H&P ADULT - NSHPREVIEWOFSYSTEMS_GEN_ALL_CORE
CONSTITUTIONAL: No fever, no chills, no weight loss  EYES: No eye pain, no visual disturbance  RESPIRATORY: No cough, No SOB  CARDIOVASCULAR: No CP, no palpitations  GASTROINTESTINAL: no abdominal pain, no n/v/d  GENITOURINARY: No dysuria, no hematuria  HEME: No easy bruising, no bleeding gums  NEURO: No headaches, no weakness  SKIN: No itching, no rashes  MSK: No joint pain, no joint swelling CONSTITUTIONAL: No fever, no chills, no weight loss  EYES: No eye pain, + visual disturbance  RESPIRATORY: No cough, No SOB  CARDIOVASCULAR: No CP, no palpitations  GASTROINTESTINAL: no abdominal pain, no n/v/d  GENITOURINARY: No dysuria, no hematuria  HEME: No easy bruising, no bleeding gums  NEURO: No headaches, no weakness  SKIN: No itching, no rashes  MSK: No joint pain, no joint swelling Constitutional: No generalized weakness, fevers, chills, or weight loss  Eyes: +L eye blindness. No eye pain or tearing.   Ears, Nose, Mouth, Throat: No runny nose, sinus pain, ear pain, tinnitus, sore throat, dysphagia, or odynophagia  Cardiovascular: No chest pain, palpitations, or LE edema  Respiratory: No cough, wheezing, hemoptysis, or shortness of breath  Gastrointestinal: No abdominal pain, nausea/vomiting, diarrhea/constipation, hematemesis, melena, or BRBPR  Genitourinary: No dysuria, frequency, urgency, or hematuria  Musculoskeletal: No back pain or joint pain, swelling, or decreased ROM  Skin: No pruritus, rashes, lesions, or wounds  Neurologic: No syncope, seizures, headache, paresthesias, numbness, or limb weakness  Psychiatric: No depression, anxiety, difficulty concentrating, anhedonia, or lack of energy  Endocrine: No heat/cold intolerance, mood swings, sweats, polydipsia, or polyuria  Hematologic/lymphatic: No purpura, petechia, or prolonged or excessive bleeding after dental extraction / injury  Allergic/Immunologic: No anaphylaxis or allergic response to materials, foods, animals    Positives and pertinent negatives noted and all other systems negative.

## 2023-05-11 NOTE — H&P ADULT - PROBLEM SELECTOR PLAN 1
Pt found to have R-sided papilledema by outpt neuro opthalmologist. Left-cordell optic nerve difficult to visualize due to L RD, ED pocus with bilateral optic nerve sheath diameter concerning for increase ICP. Denies injury or any other neurological findings  - Neurology and ophthalmology following, appreciate recs  - f/u CTH, CTA head and neck, CT venogram, MR head and MRA Head   - Consult procedure team/ IR for LP  - f/u infectious and vitamin panel  - neurochecks q4 Pt found to have R-sided papilledema by outpt neuro opthalmologist. Left-cordell optic nerve difficult to visualize due to L RD, ED pocus with bilateral optic nerve sheath diameter concerning for increase ICP. Denies injury or any other neurological findings  - Neurology and ophthalmology following, appreciate recs  - f/u CTH, CTA head and neck, CT venogram, MR head and MRA Head   - Consult procedure team/ IR for LP if imaging unrevealing  - f/u infectious and vitamin panel  - neurochecks q4 Pt found to have R-sided papilledema by outpatient neuro-ophthalmologist. Left optic nerve difficult to visualize due to L eye retinal detachment. ED POCUS with bilateral optic nerve sheath diameter concerning for increased ICP. Denies injury or any other neurological symptoms. Unclear etiology.  - Neurology and ophthalmology consulted and following, appreciate recs  - F/u CTH noncon, CTA H/N con, CT venogram head con, MRI head and orbits w/wo con, MRA head w/ con, MRV head w/ con  - Consult procedure team or IR for LP if imaging unrevealing  - F/u infectious and vitamin panel  - Neuro checks q4hrs

## 2023-05-11 NOTE — ED ADULT TRIAGE NOTE - CHIEF COMPLAINT QUOTE
states" I am having eye problems in my left eye with h/o retinal detachment and abnormality in my eye and was sent in here to see opthalmology ". denies any pain. c/o vision change.

## 2023-05-11 NOTE — H&P ADULT - NSICDXFAMILYHX_GEN_ALL_CORE_FT
FAMILY HISTORY:  Father  Still living? Unknown  Family history of lung cancer, Age at diagnosis: Age Unknown    Grandparent  Still living? Unknown  Family history of lung cancer, Age at diagnosis: Age Unknown

## 2023-05-11 NOTE — ED PROVIDER NOTE - PHYSICAL EXAMINATION
Well-appearing, well nourished, awake, alert, oriented to person, place, time/situation and in no apparent distress.    Airway patent. Neck supple.    Eyes without scleral injection. No jaundice. L pupil 4-5 mm and reactive. R pupil 3 mm and reactive. EOMI. OD: 20/20. OS: only light-perception.    Strong pulse.    Respirations unlabored.    Abdomen soft, non-tender, no guarding.    MSK: Spine appears normal, range of motion is not limited, no muscle or joint tenderness.    Alert and oriented, no gross motor or sensory deficits.    Skin: normal color for race, warm, dry and intact. No evidence of rash.    No SI/HI.

## 2023-05-11 NOTE — H&P ADULT - HISTORY OF PRESENT ILLNESS
63yo M PMHx of nephrolithiasis, vitamin D/B12 deficiency, cataracts, who was sent to the hospital by ophthalmalogist for concern of papilledema, optic nerve swelling.     Patient states around 3-4 months ago he had a fall for which he had extensive workup performed including brain imaging w/ reportedly unrevealing results. He was found to have B12 and wm D deficiencies for which he was treated. 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 He was referred to a video retinal consultant and to also see neuro opthalmology. He saw Dr Fabian Bangura today and was found with L eye end stage RD, swollen R optic nerves with delayed filling and late disc leakage (eval for papilledema), R eye retinal scar. He was recommended to visit ED urgently for MR imaging, inflammatory marker eval, opthalmology and neurology evaluation.No reported history of cancer. Retired .     Denies current or recent headaches, nausea, vomiting, dizziness, speech disturbance, word finding difficulty, extremity numbness/weakness, gait difficulty. 63yo M PMHx of nephrolithiasis, vitamin D/B12 deficiency, cataracts, who was sent to the hospital by ophthalmalogist for concern of papilledema, optic nerve swelling.     Patient states about 1 month ago he saw opthalmology who noted he had 20/20 vision in R eye but blindness in L eye w/ RD. He was referred to a video retinal consultant and to also see neuro opthalmology. He saw Dr. Rowell (retinal specialist) same day w/ plans for retinal surgery. He saw Dr Fabian Bangura (neuro ophthalmologist) today and was again found with L eye end stage RD, swollen R optic nerves with delayed filling and late disc leakage (eval for papilledema), R eye retinal scar. He was recommended to visit ED urgently for MR imaging, inflammatory marker eval, opthalmology and neurology evaluation.    Of note, patient states around 3-4 months ago he had a fall for which he had extensive workup performed including brain imaging w/ reportedly unrevealing results. He was found to have B12 and wm D deficiencies for which he was treated.    Denies current or recent headaches, fevers, chills, nausea, vomiting, dizziness, CP, SOB, abdominal pain, extremity numbness/weakness, joint pain, or swelling, or gait difficulty. 63yo M with PMHx of nephrolithiasis, vitamin D/B12 deficiency, cataracts, who was sent to the hospital by ophthalmologist due to concern for papilledema and optic nerve swelling.     Patient states that about 1 month ago he saw ophthalmology, who noted he had 20/20 vision in R eye but blindness in L eye w/ retinal detachment. He was referred to a video retinal consultant and advised to also see neuro-ophthalmology. He saw Dr. Rowell (retinal specialist) same day w/ plans for retinal surgery. He saw Dr Fabian Bangura (neuro ophthalmologist) today and was again found with L eye end-stage retinal detachment, swollen R optic nerves with delayed filling and late disc leakage (concerning for papilledema), R eye retinal scar. He was recommended to visit ED urgently for MR imaging, inflammatory marker eval, opthalmology and neurology evaluation.    Of note, patient states around 3-4 months ago he had a fall for which he had extensive workup performed including brain imaging w/ reportedly unrevealing results. He was found to have B12 and wm D deficiencies for which he was treated.    Denies current or recent headaches, fevers, chills, nausea, vomiting, dizziness, CP, SOB, abdominal pain, extremity numbness/weakness, joint pain, or swelling, or gait difficulty. 63yo M with PMHx of nephrolithiasis, vitamin D/B12 deficiency, cataracts, who was sent to the hospital by ophthalmologist due to concern for papilledema and optic nerve swelling.     Patient states that about 1 month ago he saw ophthalmology, who noted he had 20/20 vision in R eye but blindness in L eye w/ retinal detachment. He was referred to a video retinal consultant and advised to also see neuro-ophthalmology. He saw Dr. Rowell (retinal specialist) and is planned for surgery for next Tuesday for chronic retinal detachment. He saw Dr. Fabian Bangura (neuro- ophthalmologist) today and was again found with L eye end-stage retinal detachment, swollen R optic nerves with delayed filling and late disc leakage (concerning for papilledema), and R eye retinal scar. He was recommended to visit ED urgently for MR imaging, inflammatory marker eval, ophthalmology and neurology evaluation.    Of note, patient states around 3-4 months ago he had a fall for which he had extensive workup performed, including brain imaging w/ reportedly unrevealing results. He was found to have vitamin D and B12 deficiencies for which he was treated.    Denies current or recent headaches, fevers, chills, nausea, vomiting, dizziness, CP, SOB, abdominal pain, extremity numbness/weakness, joint pain, or swelling, or gait difficulty.

## 2023-05-11 NOTE — ED PROVIDER NOTE - CLINICAL SUMMARY MEDICAL DECISION MAKING FREE TEXT BOX
Beth: From Beth [address] [facility] ED Att. Pt. Catherine [id number]. Intake 4. Sent from [facility] for L ret detach and bilateral papilledema. Decreased vision on/off 1 month. OD 20/20. OS light only. CBC, CMP, PT/PTT (in case needs LP if MRI/MRA unremarkable). Beth: From Beth [address] [facility] ED Att. Pt. Catherine [id number]. Intake 4. Sent from [facility] for L ret detach and bilateral papilledema. Decreased vision on/off 1 month. OD 20/20. OS light only. CBC, CMP, PT/PTT (in case needs LP if MRI/MRA unremarkable). Given wt. loss and memory loss and fall 3 mos ago, screen for endo, infectious, cancer causes. Beth: From Beth [address] [facility] ED Att. Pt. Catherine [id number]. Intake 4. Sent from [facility] for L ret detach and bilateral papilledema. Decreased vision on/off 1 month. OD 20/20. OS light only. CBC, CMP, PT/PTT (in case needs LP if MRI/MRA unremarkable). Given wt. loss and memory loss and fall 3 mos ago, screen for endo, infectious, cancer causes. [facility]ho (I spoke w/ Resident) and Neuro consults.

## 2023-05-11 NOTE — H&P ADULT - ASSESSMENT
63yo M PMHx of nephrolithiasis, vitamin D/B12 deficiency, cataracts, who was sent to the hospital by ophthalmalogist for concern of papilledema, optic nerve swelling. Evaluated by neurology and opthalmology pending imaging. Now admitted to medicine for further management 63yo M with PMHx of nephrolithiasis, vitamin D/B12 deficiency, cataracts, who was sent to the hospital by ophthalmologist due to concern for papilledema and optic nerve swelling. Evaluated by neurology and ophthalmology pending imaging. Now admitted to medicine for further management

## 2023-05-11 NOTE — ED PROVIDER NOTE - OBJECTIVE STATEMENT
Beth: Sent from [facility] for L ret detach and bilateral papilledema. Decreased vision on/off 1 month. OD 20/20. OS light only. No HA. No relevant PMH, PSH, or FHx. No significant T/E/D. No allergies. Beth: Sent from [facility] for L ret detach and bilateral papilledema. Decreased vision on/off 1 month. OD 20/20. OS light only. No HA. No relevant PMH, PSH, or FHx. No significant T/E/D. No allergies.    Of note, 3 mos ago pt. fell for unknown reason. Was known to have B12 deficiency w/ intermediate intrinsic factor Ab. Was having poor memory. Now improved. Recent GI-ordered CT abd/pelvis for hematuria showed renal stone. Had 3o-lb wt. loss.

## 2023-05-11 NOTE — H&P ADULT - PROBLEM SELECTOR PLAN 5
DVT: lovenox  Diet: regular  Dispo: pending clinical improvement DVT: hold i/s/o possible LP  Diet: regular  Dispo: pending clinical improvement DVT ppx: Hold pharmacologic ppx i/s/o possible LP  Diet: Regular  Dispo: Pending clinical improvement

## 2023-05-11 NOTE — H&P ADULT - PROBLEM SELECTOR PLAN 3
HIE records note that pt w/ hx of HLD, not on home medications  - f/u lipid paned HIE records note that pt w/ hx of HLD, not on home medications  - F/u lipid profile

## 2023-05-12 ENCOUNTER — APPOINTMENT (OUTPATIENT)
Dept: OPHTHALMOLOGY | Facility: CLINIC | Age: 63
End: 2023-05-12

## 2023-05-12 DIAGNOSIS — Z29.9 ENCOUNTER FOR PROPHYLACTIC MEASURES, UNSPECIFIED: ICD-10-CM

## 2023-05-12 DIAGNOSIS — E78.5 HYPERLIPIDEMIA, UNSPECIFIED: ICD-10-CM

## 2023-05-12 DIAGNOSIS — H33.20 SEROUS RETINAL DETACHMENT, UNSPECIFIED EYE: ICD-10-CM

## 2023-05-12 DIAGNOSIS — H47.10 UNSPECIFIED PAPILLEDEMA: ICD-10-CM

## 2023-05-12 DIAGNOSIS — E03.9 HYPOTHYROIDISM, UNSPECIFIED: ICD-10-CM

## 2023-05-12 LAB
ALBUMIN SERPL ELPH-MCNC: 4.2 G/DL — SIGNIFICANT CHANGE UP (ref 3.3–5)
ALP SERPL-CCNC: 73 U/L — SIGNIFICANT CHANGE UP (ref 40–120)
ALT FLD-CCNC: 7 U/L — SIGNIFICANT CHANGE UP (ref 4–41)
ANION GAP SERPL CALC-SCNC: 11 MMOL/L — SIGNIFICANT CHANGE UP (ref 7–14)
AST SERPL-CCNC: 10 U/L — SIGNIFICANT CHANGE UP (ref 4–40)
BASOPHILS # BLD AUTO: 0.04 K/UL — SIGNIFICANT CHANGE UP (ref 0–0.2)
BASOPHILS NFR BLD AUTO: 0.7 % — SIGNIFICANT CHANGE UP (ref 0–2)
BILIRUB SERPL-MCNC: 0.5 MG/DL — SIGNIFICANT CHANGE UP (ref 0.2–1.2)
BUN SERPL-MCNC: 15 MG/DL — SIGNIFICANT CHANGE UP (ref 7–23)
CALCIUM SERPL-MCNC: 9.5 MG/DL — SIGNIFICANT CHANGE UP (ref 8.4–10.5)
CHLORIDE SERPL-SCNC: 102 MMOL/L — SIGNIFICANT CHANGE UP (ref 98–107)
CO2 SERPL-SCNC: 22 MMOL/L — SIGNIFICANT CHANGE UP (ref 22–31)
CREAT SERPL-MCNC: 0.77 MG/DL — SIGNIFICANT CHANGE UP (ref 0.5–1.3)
EGFR: 101 ML/MIN/1.73M2 — SIGNIFICANT CHANGE UP
EOSINOPHIL # BLD AUTO: 0.06 K/UL — SIGNIFICANT CHANGE UP (ref 0–0.5)
EOSINOPHIL NFR BLD AUTO: 1 % — SIGNIFICANT CHANGE UP (ref 0–6)
FOLATE RBC-MCNC: 1213 NG/ML — SIGNIFICANT CHANGE UP (ref 499–1504)
GLUCOSE SERPL-MCNC: 83 MG/DL — SIGNIFICANT CHANGE UP (ref 70–99)
HCT VFR BLD CALC: 41.9 % — SIGNIFICANT CHANGE UP (ref 39–50)
HCT VFR BLD CALC: 43.1 % — SIGNIFICANT CHANGE UP (ref 39–50)
HCV AB S/CO SERPL IA: 0.16 S/CO — SIGNIFICANT CHANGE UP (ref 0–0.99)
HCV AB SERPL-IMP: SIGNIFICANT CHANGE UP
HGB BLD-MCNC: 13.5 G/DL — SIGNIFICANT CHANGE UP (ref 13–17)
IANC: 3.49 K/UL — SIGNIFICANT CHANGE UP (ref 1.8–7.4)
IMM GRANULOCYTES NFR BLD AUTO: 0.3 % — SIGNIFICANT CHANGE UP (ref 0–0.9)
LYMPHOCYTES # BLD AUTO: 1.4 K/UL — SIGNIFICANT CHANGE UP (ref 1–3.3)
LYMPHOCYTES # BLD AUTO: 24.5 % — SIGNIFICANT CHANGE UP (ref 13–44)
MAGNESIUM SERPL-MCNC: 2.2 MG/DL — SIGNIFICANT CHANGE UP (ref 1.6–2.6)
MCHC RBC-ENTMCNC: 27.4 PG — SIGNIFICANT CHANGE UP (ref 27–34)
MCHC RBC-ENTMCNC: 32.2 GM/DL — SIGNIFICANT CHANGE UP (ref 32–36)
MCV RBC AUTO: 85.2 FL — SIGNIFICANT CHANGE UP (ref 80–100)
MONOCYTES # BLD AUTO: 0.71 K/UL — SIGNIFICANT CHANGE UP (ref 0–0.9)
MONOCYTES NFR BLD AUTO: 12.4 % — SIGNIFICANT CHANGE UP (ref 2–14)
NEUTROPHILS # BLD AUTO: 3.49 K/UL — SIGNIFICANT CHANGE UP (ref 1.8–7.4)
NEUTROPHILS NFR BLD AUTO: 61.1 % — SIGNIFICANT CHANGE UP (ref 43–77)
NRBC # BLD: 0 /100 WBCS — SIGNIFICANT CHANGE UP (ref 0–0)
NRBC # FLD: 0 K/UL — SIGNIFICANT CHANGE UP (ref 0–0)
PHOSPHATE SERPL-MCNC: 3.5 MG/DL — SIGNIFICANT CHANGE UP (ref 2.5–4.5)
PLATELET # BLD AUTO: 282 K/UL — SIGNIFICANT CHANGE UP (ref 150–400)
POTASSIUM SERPL-MCNC: 4.3 MMOL/L — SIGNIFICANT CHANGE UP (ref 3.5–5.3)
POTASSIUM SERPL-SCNC: 4.3 MMOL/L — SIGNIFICANT CHANGE UP (ref 3.5–5.3)
PROT SERPL-MCNC: 6.7 G/DL — SIGNIFICANT CHANGE UP (ref 6–8.3)
RBC # BLD: 4.92 M/UL — SIGNIFICANT CHANGE UP (ref 4.2–5.8)
RBC # FLD: 12.5 % — SIGNIFICANT CHANGE UP (ref 10.3–14.5)
SODIUM SERPL-SCNC: 135 MMOL/L — SIGNIFICANT CHANGE UP (ref 135–145)
WBC # BLD: 5.72 K/UL — SIGNIFICANT CHANGE UP (ref 3.8–10.5)
WBC # FLD AUTO: 5.72 K/UL — SIGNIFICANT CHANGE UP (ref 3.8–10.5)

## 2023-05-12 PROCEDURE — 99232 SBSQ HOSP IP/OBS MODERATE 35: CPT | Mod: GC

## 2023-05-12 PROCEDURE — 70496 CT ANGIOGRAPHY HEAD: CPT | Mod: 26,77

## 2023-05-12 PROCEDURE — 70496 CT ANGIOGRAPHY HEAD: CPT | Mod: 26

## 2023-05-12 PROCEDURE — 70545 MR ANGIOGRAPHY HEAD W/DYE: CPT | Mod: 26,59

## 2023-05-12 PROCEDURE — 70553 MRI BRAIN STEM W/O & W/DYE: CPT | Mod: 26

## 2023-05-12 PROCEDURE — 70543 MRI ORBT/FAC/NCK W/O &W/DYE: CPT | Mod: 26

## 2023-05-12 PROCEDURE — 99223 1ST HOSP IP/OBS HIGH 75: CPT

## 2023-05-12 PROCEDURE — 70498 CT ANGIOGRAPHY NECK: CPT | Mod: 26

## 2023-05-12 RX ORDER — ENOXAPARIN SODIUM 100 MG/ML
40 INJECTION SUBCUTANEOUS ONCE
Refills: 0 | Status: DISCONTINUED | OUTPATIENT
Start: 2023-05-12 | End: 2023-05-12

## 2023-05-12 RX ORDER — ACETAMINOPHEN 500 MG
650 TABLET ORAL EVERY 6 HOURS
Refills: 0 | Status: DISCONTINUED | OUTPATIENT
Start: 2023-05-12 | End: 2023-05-13

## 2023-05-12 NOTE — PROGRESS NOTE ADULT - PROBLEM SELECTOR PLAN 2
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 Unclear etiology, pt with prior cataract surgery. Per ophthalmology, no findings of increased ICP on their exam.  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 Unclear etiology, pt with prior cataract surgery. Per ophthalmology, no findings of increased ICP on their exam.  - Ophthalmology following, appreciate recs, to f/u otpt ophthalmology for surgery  - F/u MRI head and orbits w/wo con, MRA head w/ con, MRV head w/ con  - Consult procedure team or IR for LP if imaging unrevealing

## 2023-05-12 NOTE — PROGRESS NOTE ADULT - ASSESSMENT
63yo M with PMHx of nephrolithiasis, vitamin D/B12 deficiency, cataracts, who was sent to the hospital by ophthalmologist due to concern for papilledema and optic nerve swelling. Evaluated by neurology and ophthalmology pending imaging. Now admitted to medicine for further management 63yo M with nephrolithiasis, vitamin D/B12 deficiency, cataracts, L retinal detachment, who was sent to the hospital by ophthalmologist due to concern for papilledema and R optic nerve swelling. Evaluated by neurology and ophthalmology pending MRI imaging. Admitted to medicine for further management

## 2023-05-12 NOTE — PROGRESS NOTE ADULT - PROBLEM SELECTOR PLAN 5
DVT ppx: lovenox  Diet: Regular  Dispo: Pending clinical improvement DVT ppx: lovenox  Diet: Regular  Dispo: Pending clinical improvement, workup as per ophthalomology

## 2023-05-12 NOTE — PATIENT PROFILE ADULT - FALL HARM RISK - HARM RISK INTERVENTIONS

## 2023-05-12 NOTE — PROGRESS NOTE ADULT - PROBLEM SELECTOR PLAN 1
Pt found to have R-sided papilledema by outpatient neuro-ophthalmologist. Left optic nerve difficult to visualize due to L eye retinal detachment. ED POCUS with bilateral optic nerve sheath diameter concerning for increased ICP. Denies injury or any other neurological symptoms. Unclear etiology.  - Neurology and ophthalmology consulted and following, appreciate recs  - F/u CTH noncon, CTA H/N con, CT venogram head con, MRI head and orbits w/wo con, MRA head w/ con, MRV head w/ con  - Consult procedure team or IR for LP if imaging unrevealing  - F/u infectious and vitamin panel  - Neuro checks q4hrs Pt found to have R-sided papilledema by outpatient neuro-ophthalmologist. Left optic nerve difficult to visualize due to L eye retinal detachment. ED POCUS with bilateral optic nerve sheath diameter concerning for increased ICP. Denies injury or any other neurological symptoms. Unclear etiology.  - Neurology and ophthalmology consulted and following, appreciate recs  - CT findings as above, negative for infarct, space occupying lesion, no vascular abnormality or thrombosis  - await MRI head and orbits w/wo con, MRA head w/ con, MRV head w/ con  - Consult procedure team or IR for LP if imaging unrevealing  - F/u infectious and vitamin panel  - Neuro checks q4hrs

## 2023-05-12 NOTE — PROGRESS NOTE ADULT - ASSESSMENT
Assessment and Recommendations:  62y male with a past medical history/ocular history of Chronic RD OD consulted for optic nerve swelling OD, found to have optic nerve swelling OD. Dilated exam shows chronic RD in left eye and optic nerve swelling right eye.    # Optic nerve swelling OD   - Unable to assess OS due to chronic RD   - No symptoms of increased ICP on exam, however will need workup to exclude   - Recc MRI/MTRV brain and orbits   - Recc LP with opening pressure if imaging above in unrevealing   - Appreciate Neuro reccs       # Chronic RD OS   - No further treatment inpatient.     Seen & discussed with Dr Hwang    Outpatient Follow-up: Patient should follow-up with his/her ophthalmologist or with [facility] Department of Ophthalmology within 1 week of after discharge at:    [address]  [place] [address]  [phone]

## 2023-05-12 NOTE — PROGRESS NOTE ADULT - SUBJECTIVE AND OBJECTIVE BOX
[facility] DEPARTMENT OF OPHTHALMOLOGY  ------------------------------------------------------------------------------  Kalyan Watson MD PGY 3  [phone]  ------------------------------------------------------------------------------    Interval History: No acute events overnight. Does not know how long he's had a retinal detachment. Denies any headaches, pulsatile tinnitus.     MEDICATIONS  (STANDING):    MEDICATIONS  (PRN):      VITALS: T(C): 36.8 (05-12-23 @ 05:32)  T(F): 98.2 (05-12-23 @ 05:32), Max: 98.2 (05-11-23 @ 16:14)  HR: 83 (05-12-23 @ 05:32) (54 - 83)  BP: 139/80 (05-12-23 @ 05:32) (117/68 - 139/80)  RR:  (16 - 18)  SpO2:  (100% - 100%)  Wt(kg): --  General: AAO x 3, appropriate mood and affect    Ophthalmology Exam:  Visual acuity (sc): 20/20 OD, HM OS  Pupils: PERRL OU, +R APD OS  Ttono: 16 OD, 16 OS  Extraocular movements (EOMs): Full OU, no pain, no diplopia  Confrontational Visual Field (CVF): Full OD  Color plates: 12/12 OD    Pen Light Exam (PLE)  External: Flat OU  Lids/Lashes/Lacrimal Ducts: Flat OU    Sclera/Conjunctiva: W+Q OU  Cornea: Cl OU  Anterior Chamber: D+F OU    Iris: Flat OU  Lens: Cl OU   [facility] DEPARTMENT OF OPHTHALMOLOGY  ------------------------------------------------------------------------------  Kalyan Watson MD PGY 3  [phone]  ------------------------------------------------------------------------------    Interval History: No acute events overnight. Does not know how long he's had a retinal detachment. Denies any headaches, pulsatile tinnitus.     MEDICATIONS  (STANDING):    MEDICATIONS  (PRN):      VITALS: T(C): 36.8 (05-12-23 @ 05:32)  T(F): 98.2 (05-12-23 @ 05:32), Max: 98.2 (05-11-23 @ 16:14)  HR: 83 (05-12-23 @ 05:32) (54 - 83)  BP: 139/80 (05-12-23 @ 05:32) (117/68 - 139/80)  RR:  (16 - 18)  SpO2:  (100% - 100%)  Wt(kg): --  General: AAO x 3, appropriate mood and affect    Ophthalmology Exam:  Visual acuity (sc): 20/20 OD, HM OS  Pupils: PERRL OU, +R APD OS  Ttono: 16 OD, 16 OS  Extraocular movements (EOMs): Full OU, no pain, no diplopia  Confrontational Visual Field (CVF): Full OD  Color plates: 12/12 OD    Pen Light Exam (PLE)  External: Flat OU  Lids/Lashes/Lacrimal Ducts: Flat OU    Sclera/Conjunctiva: W+Q OU  Cornea: Cl OU  Anterior Chamber: D+F OU    Iris: Flat OU  Lens: Cl OU    Fundus Exam: dilated with 1% tropicamide and 2.5% phenylephrine  Approval obtained from primary team for dilation  Patient aware that pupils can remained dilated for at least 4-6 hours  Exam performed with 20D lens    Vitreous: wnl OU  Disc, cup/disc: blurring of disc margin OD; pallor OS difficult to discern swelling   Macula: wnl OD, poor view OS  Vessels: wnl OD, poor view OS  Periphery: wnl OD, corrugated retinal detachment OS

## 2023-05-12 NOTE — PROGRESS NOTE ADULT - PROBLEM SELECTOR PLAN 4
[facility]IE records note that pt w/ hx of hypothyroidism, not on home medications  - F/u thyroid function tests

## 2023-05-12 NOTE — PROGRESS NOTE ADULT - SUBJECTIVE AND OBJECTIVE BOX
Progress Note    05-11-23 (1d)    Patient is a 62y old  Male who presents with a chief complaint of papilledema, optic nerve swelling (12 May 2023 09:24)      Subjective / Overnight Events :  - No acute events overnight.  - pt states he is aware of his left eye blindness for the past month, however, unable to come because his father has passed away and he has other responsibility to attend to  - no headache, occasional nausea, no vomiting, no loss of bladder bowel movement. have nocturia and at time would end up passing urine if he cannot get to the bathroom fast enough. no fever. no confusion  - Pt seen and examined at bedside.     Additional ROS (if any):    MEDICATIONS  (STANDING):  enoxaparin Injectable 40 milliGRAM(s) SubCutaneous once    MEDICATIONS  (PRN):  acetaminophen     Tablet .. 650 milliGRAM(s) Oral every 6 hours PRN Temp greater or equal to 38C (100.4F), Mild Pain (1 - 3), Moderate Pain (4 - 6), Severe Pain (7 - 10)      CAPILLARY BLOOD GLUCOSE        I&O's Summary      PHYSICAL EXAM:  Vital Signs Last 24 Hrs  T(C): 36.8 (12 May 2023 05:32), Max: 36.8 (11 May 2023 16:14)  T(F): 98.2 (12 May 2023 05:32), Max: 98.2 (11 May 2023 16:14)  HR: 83 (12 May 2023 05:32) (54 - 83)  BP: 139/80 (12 May 2023 05:32) (117/68 - 139/80)  BP(mean): --  RR: 18 (12 May 2023 05:32) (16 - 18)  SpO2: 100% (12 May 2023 05:32) (100% - 100%)    Parameters below as of 12 May 2023 05:32  Patient On (Oxygen Delivery Method): room air    General: NAD, non-toxic appearing   HEENT: no scleral icterus  CV: RRR, normal S1 and S2, no m/r/g  Lungs: normal respiratory effort. CTAB, no wheezes, rales, or rhonchi  Abd: soft, nontender, nondistended  Ext: no edema, 2+ peripheral pulses   Pysch: AAOx3, appropriate affect   Neuro: grossly non-focal, moving all extremities spontaneously   Skin: no rashes or lesions     LABS:                          13.5   5.72  )-----------( 282      ( 12 May 2023 07:10 )             41.9       WBC Trend: 5.72<--, 6.14<--  Hb Trend: 13.5<--, 12.3<--    05-12    135  |  102  |  15  ----------------------------<  83  4.3   |  22  |  0.77    Ca    9.5      12 May 2023 07:10  Phos  3.5     05-12  Mg     2.20     05-12    TPro  6.7  /  Alb  4.2  /  TBili  0.5  /  DBili  x   /  AST  10  /  ALT  7   /  AlkPhos  73  05-12    PT/INR - ( 11 May 2023 18:55 )   PT: 12.9 sec;   INR: 1.11 ratio         PTT - ( 11 May 2023 18:55 )  PTT:31.9 sec              RADIOLOGY & ADDITIONAL TESTS: Reviewed Progress Note    05-11-23 (1d)    Patient is a 62y old  Male who presents with a chief complaint of papilledema, optic nerve swelling (12 May 2023 09:24)      Subjective / Overnight Events :  - No acute events overnight.  - pt states he is aware of his left eye blindness for the past month, however, unable to come because his father has passed away and he has other responsibility to attend to  - no headache, occasional nausea, no vomiting, no loss of bladder bowel movement. have nocturia and at time would end up passing urine if he cannot get to the bathroom fast enough. no fever. no confusion  - Pt seen and examined at bedside.     Additional ROS (if any):    MEDICATIONS  (STANDING):  enoxaparin Injectable 40 milliGRAM(s) SubCutaneous once    MEDICATIONS  (PRN):  acetaminophen     Tablet .. 650 milliGRAM(s) Oral every 6 hours PRN Temp greater or equal to 38C (100.4F), Mild Pain (1 - 3), Moderate Pain (4 - 6), Severe Pain (7 - 10)      CAPILLARY BLOOD GLUCOSE        I&O's Summary      PHYSICAL EXAM:  Vital Signs Last 24 Hrs  T(C): 36.8 (12 May 2023 05:32), Max: 36.8 (11 May 2023 16:14)  T(F): 98.2 (12 May 2023 05:32), Max: 98.2 (11 May 2023 16:14)  HR: 83 (12 May 2023 05:32) (54 - 83)  BP: 139/80 (12 May 2023 05:32) (117/68 - 139/80)  BP(mean): --  RR: 18 (12 May 2023 05:32) (16 - 18)  SpO2: 100% (12 May 2023 05:32) (100% - 100%)    Parameters below as of 12 May 2023 05:32  Patient On (Oxygen Delivery Method): room air    General: NAD, non-toxic appearing   HEENT: no scleral icterus  CV: RRR, normal S1 and S2, no m/r/g  Lungs: normal respiratory effort. CTAB, no wheezes, rales, or rhonchi  Abd: soft, nontender, nondistended  Ext: no edema, 2+ peripheral pulses   Pysch: AAOx3, appropriate affect   Neuro: grossly non-focal, moving all extremities spontaneously   Skin: no rashes or lesions     LABS:                          13.5   5.72  )-----------( 282      ( 12 May 2023 07:10 )             41.9       WBC Trend: 5.72<--, 6.14<--  Hb Trend: 13.5<--, 12.3<--    05-12    135  |  102  |  15  ----------------------------<  83  4.3   |  22  |  0.77    Ca    9.5      12 May 2023 07:10  Phos  3.5     05-12  Mg     2.20     05-12    TPro  6.7  /  Alb  4.2  /  TBili  0.5  /  DBili  x   /  AST  10  /  ALT  7   /  AlkPhos  73  05-12    PT/INR - ( 11 May 2023 18:55 )   PT: 12.9 sec;   INR: 1.11 ratio         PTT - ( 11 May 2023 18:55 )  PTT:31.9 sec    Consultant notes reviewed: Ophthalmology    RADIOLOGY & ADDITIONAL TESTS:  < from: CT Venogram Brain w/ IV Cont (05.12.23 @ 02:11) >  IMPRESSION:  Brain CT: No intracranial hemorrhage. No evidence of acute stroke. No   space-occupying lesion. Small amount of hemorrhage along the margin of   the left posterior globe consistent with patient's history of recent   retinal detachment  CTA: No evidence of intracranial vascular abnormality. No stenosis. No   AVM. No aneurysm  CTV: No evidence of sinus thrombosis.    < end of copied text >

## 2023-05-12 NOTE — PROGRESS NOTE ADULT - PROBLEM SELECTOR PLAN 3
HIE records note that pt w/ hx of HLD, not on home medications  - F/u lipid profile HIE records note that pt w/ hx of HLD, not on home medications  - F/u lipid profile (can also obtain as otpt if not done in hospital)

## 2023-05-13 ENCOUNTER — TRANSCRIPTION ENCOUNTER (OUTPATIENT)
Age: 63
End: 2023-05-13

## 2023-05-13 VITALS
DIASTOLIC BLOOD PRESSURE: 78 MMHG | HEART RATE: 57 BPM | OXYGEN SATURATION: 99 % | SYSTOLIC BLOOD PRESSURE: 131 MMHG | TEMPERATURE: 98 F | RESPIRATION RATE: 17 BRPM

## 2023-05-13 LAB
ALBUMIN SERPL ELPH-MCNC: 3.9 G/DL — SIGNIFICANT CHANGE UP (ref 3.3–5)
ALP SERPL-CCNC: 71 U/L — SIGNIFICANT CHANGE UP (ref 40–120)
ALT FLD-CCNC: 6 U/L — SIGNIFICANT CHANGE UP (ref 4–41)
ANION GAP SERPL CALC-SCNC: 11 MMOL/L — SIGNIFICANT CHANGE UP (ref 7–14)
APPEARANCE CSF: CLEAR — SIGNIFICANT CHANGE UP
APPEARANCE SPUN FLD: COLORLESS — SIGNIFICANT CHANGE UP
APTT BLD: 30.6 SEC — SIGNIFICANT CHANGE UP (ref 27–36.3)
AST SERPL-CCNC: 11 U/L — SIGNIFICANT CHANGE UP (ref 4–40)
BACTERIAL AG PNL SER: 0 % — SIGNIFICANT CHANGE UP
BASOPHILS # BLD AUTO: 0.04 K/UL — SIGNIFICANT CHANGE UP (ref 0–0.2)
BASOPHILS NFR BLD AUTO: 0.7 % — SIGNIFICANT CHANGE UP (ref 0–2)
BILIRUB SERPL-MCNC: 0.4 MG/DL — SIGNIFICANT CHANGE UP (ref 0.2–1.2)
BUN SERPL-MCNC: 19 MG/DL — SIGNIFICANT CHANGE UP (ref 7–23)
CALCIUM SERPL-MCNC: 9.3 MG/DL — SIGNIFICANT CHANGE UP (ref 8.4–10.5)
CHLORIDE SERPL-SCNC: 100 MMOL/L — SIGNIFICANT CHANGE UP (ref 98–107)
CO2 SERPL-SCNC: 23 MMOL/L — SIGNIFICANT CHANGE UP (ref 22–31)
COLOR CSF: COLORLESS — SIGNIFICANT CHANGE UP
CREAT SERPL-MCNC: 0.94 MG/DL — SIGNIFICANT CHANGE UP (ref 0.5–1.3)
EGFR: 92 ML/MIN/1.73M2 — SIGNIFICANT CHANGE UP
EOSINOPHIL # BLD AUTO: 0.08 K/UL — SIGNIFICANT CHANGE UP (ref 0–0.5)
EOSINOPHIL # CSF: 0 % — SIGNIFICANT CHANGE UP
EOSINOPHIL NFR BLD AUTO: 1.3 % — SIGNIFICANT CHANGE UP (ref 0–6)
GAMMA INTERFERON BACKGROUND BLD IA-ACNC: 0.01 IU/ML — SIGNIFICANT CHANGE UP
GLUCOSE CSF-MCNC: 52 MG/DL — SIGNIFICANT CHANGE UP (ref 40–70)
GLUCOSE SERPL-MCNC: 93 MG/DL — SIGNIFICANT CHANGE UP (ref 70–99)
GRAM STN FLD: SIGNIFICANT CHANGE UP
HCT VFR BLD CALC: 39.1 % — SIGNIFICANT CHANGE UP (ref 39–50)
HGB BLD-MCNC: 13 G/DL — SIGNIFICANT CHANGE UP (ref 13–17)
IANC: 3.73 K/UL — SIGNIFICANT CHANGE UP (ref 1.8–7.4)
IMM GRANULOCYTES NFR BLD AUTO: 0.3 % — SIGNIFICANT CHANGE UP (ref 0–0.9)
INR BLD: 1.09 RATIO — SIGNIFICANT CHANGE UP (ref 0.88–1.16)
LDH CSF L TO P-CCNC: 17 U/L — SIGNIFICANT CHANGE UP
LDH FLD-CCNC: 17 U/L — SIGNIFICANT CHANGE UP
LYMPHOCYTES # BLD AUTO: 1.6 K/UL — SIGNIFICANT CHANGE UP (ref 1–3.3)
LYMPHOCYTES # BLD AUTO: 26.4 % — SIGNIFICANT CHANGE UP (ref 13–44)
LYMPHOCYTES # CSF: 100 % — SIGNIFICANT CHANGE UP
M TB IFN-G BLD-IMP: NEGATIVE — SIGNIFICANT CHANGE UP
M TB IFN-G CD4+ BCKGRND COR BLD-ACNC: 0 IU/ML — SIGNIFICANT CHANGE UP
M TB IFN-G CD4+CD8+ BCKGRND COR BLD-ACNC: 0.01 IU/ML — SIGNIFICANT CHANGE UP
MAGNESIUM SERPL-MCNC: 2.1 MG/DL — SIGNIFICANT CHANGE UP (ref 1.6–2.6)
MCHC RBC-ENTMCNC: 28.1 PG — SIGNIFICANT CHANGE UP (ref 27–34)
MCHC RBC-ENTMCNC: 33.2 GM/DL — SIGNIFICANT CHANGE UP (ref 32–36)
MCV RBC AUTO: 84.4 FL — SIGNIFICANT CHANGE UP (ref 80–100)
MONOCYTES # BLD AUTO: 0.6 K/UL — SIGNIFICANT CHANGE UP (ref 0–0.9)
MONOCYTES NFR BLD AUTO: 9.9 % — SIGNIFICANT CHANGE UP (ref 2–14)
MONOS+MACROS NFR CSF: 0 % — SIGNIFICANT CHANGE UP
NEUTROPHILS # BLD AUTO: 3.73 K/UL — SIGNIFICANT CHANGE UP (ref 1.8–7.4)
NEUTROPHILS # CSF: 0 % — SIGNIFICANT CHANGE UP
NEUTROPHILS NFR BLD AUTO: 61.4 % — SIGNIFICANT CHANGE UP (ref 43–77)
NRBC # BLD: 0 /100 WBCS — SIGNIFICANT CHANGE UP (ref 0–0)
NRBC # FLD: 0 K/UL — SIGNIFICANT CHANGE UP (ref 0–0)
NRBC NFR CSF: 29 CELLS/UL — HIGH (ref 0–5)
OTHER CELLS CSF MANUAL: 0 % — SIGNIFICANT CHANGE UP
PHOSPHATE SERPL-MCNC: 3.7 MG/DL — SIGNIFICANT CHANGE UP (ref 2.5–4.5)
PLATELET # BLD AUTO: 269 K/UL — SIGNIFICANT CHANGE UP (ref 150–400)
POTASSIUM SERPL-MCNC: 4.3 MMOL/L — SIGNIFICANT CHANGE UP (ref 3.5–5.3)
POTASSIUM SERPL-SCNC: 4.3 MMOL/L — SIGNIFICANT CHANGE UP (ref 3.5–5.3)
PROT CSF-MCNC: 92 MG/DL — HIGH (ref 15–45)
PROT SERPL-MCNC: 6.2 G/DL — SIGNIFICANT CHANGE UP (ref 6–8.3)
PROTHROM AB SERPL-ACNC: 12.6 SEC — SIGNIFICANT CHANGE UP (ref 10.5–13.4)
QUANT TB PLUS MITOGEN MINUS NIL: >10 IU/ML — SIGNIFICANT CHANGE UP
RBC # BLD: 4.63 M/UL — SIGNIFICANT CHANGE UP (ref 4.2–5.8)
RBC # CSF: 10 CELLS/UL — HIGH (ref 0–0)
RBC # FLD: 12.5 % — SIGNIFICANT CHANGE UP (ref 10.3–14.5)
SODIUM SERPL-SCNC: 134 MMOL/L — LOW (ref 135–145)
SPECIMEN SOURCE: SIGNIFICANT CHANGE UP
TOTAL CELLS COUNTED, SPINAL FLUID: 100 CELLS — SIGNIFICANT CHANGE UP
TUBE TYPE: SIGNIFICANT CHANGE UP
WBC # BLD: 6.07 K/UL — SIGNIFICANT CHANGE UP (ref 3.8–10.5)
WBC # FLD AUTO: 6.07 K/UL — SIGNIFICANT CHANGE UP (ref 3.8–10.5)

## 2023-05-13 PROCEDURE — 99239 HOSP IP/OBS DSCHRG MGMT >30: CPT

## 2023-05-13 NOTE — PROGRESS NOTE ADULT - ASSESSMENT
61yo M with nephrolithiasis, vitamin D/B12 deficiency, cataracts, L retinal detachment, who was sent to the hospital by ophthalmologist due to concern for papilledema and R optic nerve swelling. Evaluated by neurology and ophthalmology pending MRI imaging. Admitted to medicine for further management LOV 9/22/2020  Pending none    Labs  1/14/2021    Meets clinic protocols medication was refilled        none

## 2023-05-13 NOTE — DISCHARGE NOTE PROVIDER - CARE PROVIDER_API CALL
Satnam Fontenot)  Ophthalmology  [address], [address]  [place] [address]  Phone: [phone]  Established Patient  Follow Up Time: 1 week

## 2023-05-13 NOTE — PROGRESS NOTE ADULT - PROBLEM SELECTOR PLAN 5
DVT ppx: hold for LP  Diet: Regular  Dispo: Pending clinical improvement, workup as per ophthalomology

## 2023-05-13 NOTE — DISCHARGE NOTE NURSING/CASE MANAGEMENT/SOCIAL WORK - PATIENT PORTAL LINK FT
You can access the FollowMyHealth Patient Portal offered by [facility] by registering at the following website: [email or web address]. By joining [facility]’s FollowMyHealth portal, you will also be able to view your health information using other applications (apps) compatible with our system.

## 2023-05-13 NOTE — PROGRESS NOTE ADULT - ATTENDING COMMENTS
63 yo M w/ L retinal detachment (being planned for otpt surgery), sent in by ophthalmology due to concern for R optic nerve swelling. evaluated by optho and neurology. CT head without acute findings. MRI Brain and orbit performed which showed No hydrocephalus, mass effect, acute intracranial hemorrhage, vasogenic edema, or acute territorial infarct. S/P LP, with opening pressure noted to be 21mmHG. Pt denies headache/nausea/vomiting, exam with no new neurological deficits. vss and labs stable. discussed image and LP findings with optho who recommends no acute intervention and ok with f/u at outpatient neuro-ophtho.     Pt is medically stable for DC with close fu outpatient.
Patient seen and examined, d/w Dr. Lau, agree w/ above.     61 yo M w/ L retinal detachment (being planned for otpt surgery), sent in by ophthalmology due to concern for R optic nerve swelling, pending MRI/MRTV for further evaluation, CT findings unremarkable (no infarct, vascular abnormality or thrombosis). Neurology and opththalmology input appreciated. Team trying to expedite MRI, will f/u results. May need LP with opening pressure if MRI unrevealing (via procedure team vs IR). Patient in good spirits, denies eye pain, only complaint is hunger (diet ordered), aware of plan for imaging, makes a pun about keeping an eye on things. no other questions/concerns at this time.

## 2023-05-13 NOTE — PROGRESS NOTE ADULT - PROBLEM SELECTOR PLAN 2
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 Unclear etiology, pt with prior cataract surgery. Per ophthalmology, no findings of increased ICP on their exam.  - Ophthalmology following, appreciate recs, to f/u otpt ophthalmology for surgery  - F/u MRI head and orbits w/wo con, MRA head w/ con, MRV head w/ con  - Consult procedure team or IR for LP if imaging unrevealing

## 2023-05-13 NOTE — PROCEDURE NOTE - NSDIAGNOSTIC_RESP_A_CORE
Called pt for the 3rd time in regards to a referral for a colon screening. LM for pt to call back and make an appt. Diagnostic

## 2023-05-13 NOTE — DISCHARGE NOTE PROVIDER - NSDCFUSCHEDAPPT_GEN_ALL_CORE_FT
Ambrocio Rowell  [facility]  [facility]  Scheduled Appointment: 05/16/2023    Manuel Wilkinson  [facility] Physician [facility]  UROLOGY [address] [facility]  Scheduled Appointment: 06/01/2023

## 2023-05-13 NOTE — DISCHARGE NOTE PROVIDER - HOSPITAL COURSE
61yo M with PMHx of nephrolithiasis, vitamin D/B12 deficiency, cataracts, who was sent to the hospital by ophthalmologist due to concern for papilledema and optic nerve swelling.     Patient states that about 1 month ago he saw ophthalmology, who noted he had 20/20 vision in R eye but blindness in L eye w/ retinal detachment. He was referred to a video retinal consultant and advised to also see neuro-ophthalmology. He saw Dr. Rowell (retinal specialist) and is planned for surgery for next Tuesday for chronic retinal detachment. He saw Dr. Fabian Bangura (neuro- ophthalmologist) today and was again found with L eye end-stage retinal detachment, swollen R optic nerves with delayed filling and late disc leakage (concerning for papilledema), and R eye retinal scar. He was recommended to visit ED urgently for MR imaging, inflammatory marker eval, ophthalmology and neurology evaluation.    Of note, patient states around 3-4 months ago he had a fall for which he had extensive workup performed, including brain imaging w/ reportedly unrevealing results. He was found to have vitamin D and B12 deficiencies for which he was treated.    Denies current or recent headaches, fevers, chills, nausea, vomiting, dizziness, CP, SOB, abdominal pain, extremity numbness/weakness, joint pain, or swelling, or gait difficulty.    He had an MR brain and CT imaging which were unrevealing aside from the eye complaints that were already known. An LP was performed with a very slightly increased opening pressure of 21 and a closing pressure of 11. Case was discussed with ophthalmology who did not have further recommendations. The LP fluid did have some proteinemia; however, without symptoms no official diagnosis could be made and the patient was discharged with follow up with his retinal specialist and neuro-ophthalmologist.

## 2023-05-13 NOTE — PROGRESS NOTE ADULT - REASON FOR ADMISSION
Papilledema, optic nerve swelling

## 2023-05-13 NOTE — PROGRESS NOTE ADULT - PROBLEM SELECTOR PLAN 1
Pt found to have R-sided papilledema by outpatient neuro-ophthalmologist. Left optic nerve difficult to visualize due to L eye retinal detachment. ED POCUS with bilateral optic nerve sheath diameter concerning for increased ICP. Denies injury or any other neurological symptoms. Unclear etiology.  - Neurology and ophthalmology consulted and following, appreciate recs  - CT findings as above, negative for infarct, space occupying lesion, no vascular abnormality or thrombosis  - await MRI head and orbits w/wo con, MRA head w/ con, MRV head w/ con  - Consult procedure team or IR for LP if imaging unrevealing  - F/u infectious and vitamin panel  - Neuro checks q4hrs Pt found to have R-sided papilledema by outpatient neuro-ophthalmologist. Left optic nerve difficult to visualize due to L eye retinal detachment. ED POCUS with bilateral optic nerve sheath diameter concerning for increased ICP. Denies injury or any other neurological symptoms. Unclear etiology. CT and MRI showed no acute pathology.  LP performed on 5/13, opening presure 21 mmHg    - discuss with optho. no extra test for CSF. Will see in outpatient

## 2023-05-13 NOTE — DISCHARGE NOTE NURSING/CASE MANAGEMENT/SOCIAL WORK - HAVE YOU HAD COVID IN THE LAST 60 DAYS?
Continue ointment as needed on incision, no sex or masturbation 4 wks longer    Follow up 3 months for repeat exam  
No

## 2023-05-13 NOTE — DISCHARGE NOTE NURSING/CASE MANAGEMENT/SOCIAL WORK - NSDCPEFALRISK_GEN_ALL_CORE
For information on Fall & Injury Prevention, visit: [email or web address] OR  [email or web address] OR  https://www.cdc.gov/steadi/patient.html

## 2023-05-13 NOTE — DISCHARGE NOTE PROVIDER - NSDCCPCAREPLAN_GEN_ALL_CORE_FT
PRINCIPAL DISCHARGE DIAGNOSIS  Diagnosis: Left retinal detachment  Assessment and Plan of Treatment: You should continue follow up with your retinal specialist for a possible surgery for your retinal detachment and possibly lead to improvement of your vision.      SECONDARY DISCHARGE DIAGNOSES  Diagnosis: Papilledema  Assessment and Plan of Treatment: You were seen in the hospital for swelling of your optic nerve in your right eye. We did not find any concerning findings in your MRI and your lumbar puncture was not fully revealing of what condition may be causing this swelling. You should follow up with your primary care doctor and your neuro ophthalmologist regarding further care or any further testing at this time. You should return to the emergency department immediately if you develop any new symptoms such as changes in your vision, fevers, chills, any new type of pain like joint pain or any other new neurological changes.

## 2023-05-13 NOTE — PROGRESS NOTE ADULT - SUBJECTIVE AND OBJECTIVE BOX
Progress Note    05-11-23 (2d)    Patient is a 62y old  Male who presents with a chief complaint of Papilledema, optic nerve swelling (12 May 2023 11:12)      Subjective / Overnight Events :  - No acute events overnight.  - Pt seen and examined at bedside.     Additional ROS (if any):    MEDICATIONS  (STANDING):    MEDICATIONS  (PRN):  acetaminophen     Tablet .. 650 milliGRAM(s) Oral every 6 hours PRN Temp greater or equal to 38C (100.4F), Mild Pain (1 - 3), Moderate Pain (4 - 6), Severe Pain (7 - 10)      CAPILLARY BLOOD GLUCOSE        I&O's Summary      PHYSICAL EXAM:  Vital Signs Last 24 Hrs  T(C): 36.7 (13 May 2023 06:21), Max: 36.8 (12 May 2023 21:53)  T(F): 98 (13 May 2023 06:21), Max: 98.3 (12 May 2023 21:53)  HR: 57 (13 May 2023 06:21) (55 - 57)  BP: 131/78 (13 May 2023 06:21) (105/66 - 131/78)  BP(mean): --  RR: 17 (13 May 2023 06:21) (17 - 18)  SpO2: 99% (13 May 2023 06:21) (99% - 100%)    Parameters below as of 13 May 2023 06:21  Patient On (Oxygen Delivery Method): room air    General: NAD, non-toxic appearing   HEENT: PERRLA, EOMi, no scleral icterus  CV: RRR, normal S1 and S2, no m/r/g  Lungs: normal respiratory effort. CTAB, no wheezes, rales, or rhonchi  Abd: soft, nontender, nondistended  Ext: no edema, 2+ peripheral pulses   Pysch: AAOx3, appropriate affect   Neuro: grossly non-focal, moving all extremities spontaneously   Skin: no rashes or lesions     LABS:                          13.0   6.07  )-----------( 269      ( 13 May 2023 06:30 )             39.1       WBC Trend: 6.07<--, 5.72<--, 6.14<--  Hb Trend: 13.0<--, 13.5<--, 12.3<--    05-12    135  |  102  |  15  ----------------------------<  83  4.3   |  22  |  0.77    Ca    9.5      12 May 2023 07:10  Phos  3.5     05-12  Mg     2.20     05-12    TPro  6.7  /  Alb  4.2  /  TBili  0.5  /  DBili  x   /  AST  10  /  ALT  7   /  AlkPhos  73  05-12    PT/INR - ( 13 May 2023 06:30 )   PT: 12.6 sec;   INR: 1.09 ratio         PTT - ( 13 May 2023 06:30 )  PTT:30.6 sec              RADIOLOGY & ADDITIONAL TESTS: Reviewed

## 2023-05-13 NOTE — PROGRESS NOTE ADULT - PROBLEM SELECTOR PLAN 3
[facility] records note that pt w/ hx of HLD, not on home medications  - F/u lipid profile (can also obtain as otpt if not done in hospital)

## 2023-05-14 LAB — IF BLOCK AB SER-ACNC: 1 AU/ML — SIGNIFICANT CHANGE UP (ref 0–1.1)

## 2023-05-15 LAB — VDRL CSF-TITR: SIGNIFICANT CHANGE UP

## 2023-05-16 LAB
IC SERPL C1Q BIND-ACNC: <1.2 UG EQ/ML — SIGNIFICANT CHANGE UP
IC SERPL RAJI CELL-ACNC: 4.7 UG EQ/ML — SIGNIFICANT CHANGE UP

## 2023-05-18 LAB
CULTURE RESULTS: SIGNIFICANT CHANGE UP
SPECIMEN SOURCE: SIGNIFICANT CHANGE UP

## 2023-05-22 ENCOUNTER — OFFICE (OUTPATIENT)
Dept: URBAN - METROPOLITAN AREA CLINIC 77 | Facility: CLINIC | Age: 63
Setting detail: OPHTHALMOLOGY
End: 2023-05-22
Payer: COMMERCIAL

## 2023-05-22 DIAGNOSIS — H33.311: ICD-10-CM

## 2023-05-22 DIAGNOSIS — H47.321: ICD-10-CM

## 2023-05-22 DIAGNOSIS — H47.10: ICD-10-CM

## 2023-05-22 DIAGNOSIS — H31.001: ICD-10-CM

## 2023-05-22 DIAGNOSIS — H47.011: ICD-10-CM

## 2023-05-22 DIAGNOSIS — Z96.1: ICD-10-CM

## 2023-05-22 DIAGNOSIS — H02.035: ICD-10-CM

## 2023-05-22 DIAGNOSIS — H02.032: ICD-10-CM

## 2023-05-22 DIAGNOSIS — H35.22: ICD-10-CM

## 2023-05-22 DIAGNOSIS — H33.012: ICD-10-CM

## 2023-05-22 PROCEDURE — 92134 CPTRZ OPH DX IMG PST SGM RTA: CPT | Performed by: OPHTHALMOLOGY

## 2023-05-22 PROCEDURE — 92201 OPSCPY EXTND RTA DRAW UNI/BI: CPT | Performed by: OPHTHALMOLOGY

## 2023-05-22 PROCEDURE — 92083 EXTENDED VISUAL FIELD XM: CPT | Performed by: OPHTHALMOLOGY

## 2023-05-22 PROCEDURE — 99215 OFFICE O/P EST HI 40 MIN: CPT | Performed by: OPHTHALMOLOGY

## 2023-05-22 PROCEDURE — 76512 OPH US DX B-SCAN: CPT | Performed by: OPHTHALMOLOGY

## 2023-05-22 ASSESSMENT — REFRACTION_CURRENTRX
OS_OVR_VA: 20/
OD_VPRISM_DIRECTION: SV
OD_OVR_VA: 20/
OD_SPHERE: +2.50

## 2023-05-22 ASSESSMENT — KERATOMETRY
OD_AXISANGLE_DEGREES: 101
OS_K1POWER_DIOPTERS: 42.50
OD_K1POWER_DIOPTERS: 43.50
OS_AXISANGLE_DEGREES: 045
OS_K2POWER_DIOPTERS: 43.75
OD_K2POWER_DIOPTERS: 44.00

## 2023-05-22 ASSESSMENT — REFRACTION_AUTOREFRACTION
OD_AXIS: 025
OS_SPHERE: UNABLE
OD_SPHERE: PLANO
OD_CYLINDER: -0.75

## 2023-05-22 ASSESSMENT — VISUAL ACUITY
OD_BCVA: 20/LP
OS_BCVA: 20/20

## 2023-05-22 ASSESSMENT — TONOMETRY
OS_IOP_MMHG: 15
OD_IOP_MMHG: 16

## 2023-05-22 ASSESSMENT — LID POSITION - ENTROPION
OS_ENTROPION: 1+
OD_ENTROPION: 1+

## 2023-06-01 ENCOUNTER — APPOINTMENT (OUTPATIENT)
Dept: UROLOGY | Facility: CLINIC | Age: 63
End: 2023-06-01
Payer: COMMERCIAL

## 2023-06-01 VITALS
HEIGHT: 69 IN | DIASTOLIC BLOOD PRESSURE: 68 MMHG | WEIGHT: 166 LBS | OXYGEN SATURATION: 98 % | RESPIRATION RATE: 16 BRPM | BODY MASS INDEX: 24.59 KG/M2 | SYSTOLIC BLOOD PRESSURE: 105 MMHG | HEART RATE: 77 BPM | TEMPERATURE: 98.2 F

## 2023-06-01 DIAGNOSIS — R31.29 OTHER MICROSCOPIC HEMATURIA: ICD-10-CM

## 2023-06-01 PROBLEM — E78.5 HYPERLIPIDEMIA, UNSPECIFIED: Chronic | Status: ACTIVE | Noted: 2023-05-11

## 2023-06-01 PROBLEM — E03.9 HYPOTHYROIDISM, UNSPECIFIED: Chronic | Status: ACTIVE | Noted: 2023-05-11

## 2023-06-01 PROCEDURE — 99214 OFFICE O/P EST MOD 30 MIN: CPT

## 2023-06-02 ENCOUNTER — APPOINTMENT (OUTPATIENT)
Dept: RADIOLOGY | Facility: CLINIC | Age: 63
End: 2023-06-02
Payer: COMMERCIAL

## 2023-06-02 ENCOUNTER — APPOINTMENT (OUTPATIENT)
Dept: ULTRASOUND IMAGING | Facility: CLINIC | Age: 63
End: 2023-06-02
Payer: COMMERCIAL

## 2023-06-02 ENCOUNTER — NON-APPOINTMENT (OUTPATIENT)
Age: 63
End: 2023-06-02

## 2023-06-02 ENCOUNTER — OUTPATIENT (OUTPATIENT)
Dept: OUTPATIENT SERVICES | Facility: HOSPITAL | Age: 63
LOS: 1 days | End: 2023-06-02
Payer: COMMERCIAL

## 2023-06-02 DIAGNOSIS — N20.0 CALCULUS OF KIDNEY: ICD-10-CM

## 2023-06-02 LAB
ANION GAP SERPL CALC-SCNC: 14 MMOL/L
APPEARANCE: ABNORMAL
BACTERIA: NEGATIVE /HPF
BILIRUBIN URINE: NEGATIVE
BLOOD URINE: ABNORMAL
BUN SERPL-MCNC: 17 MG/DL
CALCIUM SERPL-MCNC: 9.9 MG/DL
CALCIUM SERPL-MCNC: 9.9 MG/DL
CAST: 3 /LPF
CHLORIDE SERPL-SCNC: 96 MMOL/L
CO2 SERPL-SCNC: 24 MMOL/L
COLOR: NORMAL
CREAT SERPL-MCNC: 0.9 MG/DL
EGFR: 97 ML/MIN/1.73M2
EPITHELIAL CELLS: 2 /HPF
GLUCOSE QUALITATIVE U: NEGATIVE MG/DL
GLUCOSE SERPL-MCNC: 89 MG/DL
KETONES URINE: ABNORMAL MG/DL
LEUKOCYTE ESTERASE URINE: NEGATIVE
MICROSCOPIC-UA: NORMAL
NITRITE URINE: NEGATIVE
PARATHYROID HORMONE INTACT: 28 PG/ML
PH URINE: 5.5
POTASSIUM SERPL-SCNC: 5.2 MMOL/L
PROTEIN URINE: 30 MG/DL
RED BLOOD CELLS URINE: 33 /HPF
SODIUM SERPL-SCNC: 134 MMOL/L
SPECIFIC GRAVITY URINE: 1.02
URATE SERPL-MCNC: 4.4 MG/DL
UROBILINOGEN URINE: 0.2 MG/DL
WHITE BLOOD CELLS URINE: 2 /HPF

## 2023-06-02 PROCEDURE — 76770 US EXAM ABDO BACK WALL COMP: CPT | Mod: 26

## 2023-06-02 PROCEDURE — 76770 US EXAM ABDO BACK WALL COMP: CPT

## 2023-06-02 PROCEDURE — 74018 RADEX ABDOMEN 1 VIEW: CPT | Mod: 26

## 2023-06-02 PROCEDURE — 74018 RADEX ABDOMEN 1 VIEW: CPT

## 2023-06-04 PROBLEM — R31.29 MICROHEMATURIA: Status: ACTIVE | Noted: 2023-02-02

## 2023-06-04 NOTE — HISTORY OF PRESENT ILLNESS
[FreeTextEntry1] : 62 year old male presents for follow up. \par Had CT scan and had called with results: 1.2 cm left renal pelvic stone. \par Has reasonable stream, daytime frequency 4 to 5 x and nocturia 1 to 8 x \par Has on and off urgency \par No flank pain, dysuria and hematuria. \par Still has to have more eye surgery. \par \par Seen on 2/223 for Microhematuria. \par Was told has microscopic hematuria. \par Denied gross hematuria, no history of kidney stones or recurrent urinary tract infections. \par Father and Paternal aunt had kidney stone. \par Past smoker. \par Reported reasonable stream, urinates 4 to 5 x or so during the day. No nocturia. \par Denied hesitancy, straining, intermittency, urgency, incontinence, sense of incomplete emptying.\par Denied dysuria, lower abdominal or flank pain, fever, chills or rigors.\par Not sexually active. Has Erectile dysfunction. \par No family history of Prostate cancer. \par

## 2023-06-04 NOTE — ASSESSMENT
[FreeTextEntry1] : Reviewed records.\par Discussed labs and imaging. \par \par Non genitourinary findings were discussed.\par \par Benign Prostatic Hyperplasia:\par Discussed treatment options and use of alpha blockers. \par Patient wants to hold off for now. \par \par Microhematuria:\par Discussed work up of hematuria so far. Discussed pending Cystoscopy. \par Discussed the procedure, risks and benefits. Patient hesitant. \par \par Kidney stone:\par Discussed the management options for non obstructing kidney stones- watch and wait Vs Ureteroscopy Vs Shock wave lithotripsy- if radio-opaque or ultrasound amenable. \par Risks and benefits of each modality were discussed. \par Patient will consider options. \par Will get Urinalysis and Urine culture.\par Will get Basic metabolic panel, Complete blood count, Parathyroid Hormone with Calcium and Uric acid \par Will get Renal Bladder Ultrasound and X ray of Kidney, Ureter and Bladder. \par \par Will inform results. \par \par Recommended that patient report to Emergency department if condition worsens: develops high grade fever, persistent nausea and vomiting, uncontrolled pain, may need urgent ureteral stent placement.\par Discussed I am [facility] based [facility] Physician and will only be able to help with my partners in urgent/emergent condition if patient comes into that hospital.

## 2023-06-06 LAB — BACTERIA UR CULT: NORMAL

## 2023-06-08 ENCOUNTER — OFFICE (OUTPATIENT)
Dept: URBAN - METROPOLITAN AREA CLINIC 77 | Facility: CLINIC | Age: 63
Setting detail: OPHTHALMOLOGY
End: 2023-06-08
Payer: COMMERCIAL

## 2023-06-08 DIAGNOSIS — H33.311: ICD-10-CM

## 2023-06-08 DIAGNOSIS — H33.012: ICD-10-CM

## 2023-06-08 DIAGNOSIS — H31.001: ICD-10-CM

## 2023-06-08 DIAGNOSIS — H47.011: ICD-10-CM

## 2023-06-08 DIAGNOSIS — H47.321: ICD-10-CM

## 2023-06-08 DIAGNOSIS — Z96.1: ICD-10-CM

## 2023-06-08 DIAGNOSIS — H47.10: ICD-10-CM

## 2023-06-08 DIAGNOSIS — H35.22: ICD-10-CM

## 2023-06-08 DIAGNOSIS — H02.032: ICD-10-CM

## 2023-06-08 DIAGNOSIS — H02.035: ICD-10-CM

## 2023-06-08 PROCEDURE — 92202 OPSCPY EXTND ON/MAC DRAW: CPT | Performed by: OPHTHALMOLOGY

## 2023-06-08 PROCEDURE — 76512 OPH US DX B-SCAN: CPT | Performed by: OPHTHALMOLOGY

## 2023-06-08 PROCEDURE — 99214 OFFICE O/P EST MOD 30 MIN: CPT | Performed by: OPHTHALMOLOGY

## 2023-06-08 PROCEDURE — 92083 EXTENDED VISUAL FIELD XM: CPT | Performed by: OPHTHALMOLOGY

## 2023-06-08 ASSESSMENT — LID POSITION - ENTROPION
OD_ENTROPION: 1+
OS_ENTROPION: 1+

## 2023-06-08 ASSESSMENT — KERATOMETRY
OS_AXISANGLE_DEGREES: 045
OD_AXISANGLE_DEGREES: 101
OD_K2POWER_DIOPTERS: 44.00
OS_K1POWER_DIOPTERS: 42.50
OS_K2POWER_DIOPTERS: 43.75
OD_K1POWER_DIOPTERS: 43.50

## 2023-06-08 ASSESSMENT — REFRACTION_CURRENTRX
OS_OVR_VA: 20/
OD_SPHERE: +2.50
OD_OVR_VA: 20/
OD_VPRISM_DIRECTION: SV

## 2023-06-08 ASSESSMENT — CONFRONTATIONAL VISUAL FIELD TEST (CVF)
OD_FINDINGS: FULL
OS_FINDINGS: FULL

## 2023-06-08 ASSESSMENT — REFRACTION_AUTOREFRACTION
OD_SPHERE: PLANO
OD_AXIS: 025
OD_CYLINDER: -0.75
OS_SPHERE: UNABLE

## 2023-06-08 ASSESSMENT — VISUAL ACUITY
OD_BCVA: 20/LP
OS_BCVA: 20/20

## 2023-06-09 ENCOUNTER — NON-APPOINTMENT (OUTPATIENT)
Age: 63
End: 2023-06-09

## 2023-06-29 ENCOUNTER — OFFICE (OUTPATIENT)
Dept: URBAN - METROPOLITAN AREA CLINIC 77 | Facility: CLINIC | Age: 63
Setting detail: OPHTHALMOLOGY
End: 2023-06-29
Payer: COMMERCIAL

## 2023-06-29 DIAGNOSIS — H02.035: ICD-10-CM

## 2023-06-29 DIAGNOSIS — H31.001: ICD-10-CM

## 2023-06-29 DIAGNOSIS — H47.321: ICD-10-CM

## 2023-06-29 DIAGNOSIS — Z96.1: ICD-10-CM

## 2023-06-29 DIAGNOSIS — H33.311: ICD-10-CM

## 2023-06-29 DIAGNOSIS — H43.813: ICD-10-CM

## 2023-06-29 DIAGNOSIS — H35.22: ICD-10-CM

## 2023-06-29 DIAGNOSIS — H47.011: ICD-10-CM

## 2023-06-29 DIAGNOSIS — H33.012: ICD-10-CM

## 2023-06-29 DIAGNOSIS — H02.032: ICD-10-CM

## 2023-06-29 DIAGNOSIS — H47.10: ICD-10-CM

## 2023-06-29 PROCEDURE — 99214 OFFICE O/P EST MOD 30 MIN: CPT | Performed by: OPHTHALMOLOGY

## 2023-06-29 PROCEDURE — 92250 FUNDUS PHOTOGRAPHY W/I&R: CPT | Performed by: OPHTHALMOLOGY

## 2023-06-29 PROCEDURE — 92083 EXTENDED VISUAL FIELD XM: CPT | Performed by: OPHTHALMOLOGY

## 2023-06-29 ASSESSMENT — REFRACTION_AUTOREFRACTION
OS_SPHERE: UNABLE
OD_AXIS: 025
OD_SPHERE: PLANO
OD_CYLINDER: -0.75

## 2023-06-29 ASSESSMENT — CONFRONTATIONAL VISUAL FIELD TEST (CVF)
OS_FINDINGS: FULL
OD_FINDINGS: FULL

## 2023-06-29 ASSESSMENT — KERATOMETRY
OD_AXISANGLE_DEGREES: 101
OD_K2POWER_DIOPTERS: 44.00
OD_K1POWER_DIOPTERS: 43.50
OS_K1POWER_DIOPTERS: 42.50
OS_K2POWER_DIOPTERS: 43.75
OS_AXISANGLE_DEGREES: 045

## 2023-06-29 ASSESSMENT — VISUAL ACUITY
OS_BCVA: 20/20
OD_BCVA: 20/LP

## 2023-06-29 ASSESSMENT — REFRACTION_CURRENTRX
OD_VPRISM_DIRECTION: SV
OD_SPHERE: +2.50
OD_OVR_VA: 20/
OS_OVR_VA: 20/

## 2023-06-29 ASSESSMENT — LID POSITION - ENTROPION
OD_ENTROPION: 1+
OS_ENTROPION: 1+

## 2023-07-07 ENCOUNTER — APPOINTMENT (OUTPATIENT)
Dept: UROLOGY | Facility: HOSPITAL | Age: 63
End: 2023-07-07

## 2023-08-04 ENCOUNTER — APPOINTMENT (OUTPATIENT)
Dept: UROLOGY | Facility: CLINIC | Age: 63
End: 2023-08-04

## 2023-08-17 ENCOUNTER — OFFICE (OUTPATIENT)
Dept: URBAN - METROPOLITAN AREA CLINIC 77 | Facility: CLINIC | Age: 63
Setting detail: OPHTHALMOLOGY
End: 2023-08-17
Payer: COMMERCIAL

## 2023-08-17 DIAGNOSIS — H47.321: ICD-10-CM

## 2023-08-17 DIAGNOSIS — H02.035: ICD-10-CM

## 2023-08-17 DIAGNOSIS — H47.011: ICD-10-CM

## 2023-08-17 DIAGNOSIS — H33.311: ICD-10-CM

## 2023-08-17 DIAGNOSIS — Z96.1: ICD-10-CM

## 2023-08-17 DIAGNOSIS — H33.012: ICD-10-CM

## 2023-08-17 DIAGNOSIS — H47.10: ICD-10-CM

## 2023-08-17 DIAGNOSIS — H31.001: ICD-10-CM

## 2023-08-17 DIAGNOSIS — H43.813: ICD-10-CM

## 2023-08-17 DIAGNOSIS — H35.22: ICD-10-CM

## 2023-08-17 DIAGNOSIS — H02.032: ICD-10-CM

## 2023-08-17 DIAGNOSIS — H50.10: ICD-10-CM

## 2023-08-17 PROCEDURE — 92083 EXTENDED VISUAL FIELD XM: CPT | Performed by: OPHTHALMOLOGY

## 2023-08-17 PROCEDURE — 92250 FUNDUS PHOTOGRAPHY W/I&R: CPT | Performed by: OPHTHALMOLOGY

## 2023-08-17 PROCEDURE — 92060 SENSORIMOTOR EXAMINATION: CPT | Performed by: OPHTHALMOLOGY

## 2023-08-17 PROCEDURE — 99214 OFFICE O/P EST MOD 30 MIN: CPT | Performed by: OPHTHALMOLOGY

## 2023-08-17 ASSESSMENT — VISUAL ACUITY: OS_BCVA: 20/20

## 2023-08-17 ASSESSMENT — REFRACTION_AUTOREFRACTION
OD_CYLINDER: -0.75
OS_SPHERE: UNABLE
OD_AXIS: 031
OD_SPHERE: +0.25

## 2023-08-17 ASSESSMENT — LID POSITION - ENTROPION
OS_ENTROPION: 1+
OD_ENTROPION: 1+

## 2023-08-17 ASSESSMENT — REFRACTION_CURRENTRX
OD_SPHERE: +2.50
OD_OVR_VA: 20/
OD_VPRISM_DIRECTION: SV
OS_OVR_VA: 20/

## 2023-08-17 ASSESSMENT — KERATOMETRY
OS_AXISANGLE_DEGREES: 057
OS_K1POWER_DIOPTERS: 42.50
OD_K2POWER_DIOPTERS: 44.00
OD_K1POWER_DIOPTERS: 43.50
OS_K2POWER_DIOPTERS: 44.00
OD_AXISANGLE_DEGREES: 112

## 2023-08-17 ASSESSMENT — CONFRONTATIONAL VISUAL FIELD TEST (CVF)
OD_FINDINGS: FULL
OS_FINDINGS: FULL

## 2023-08-17 ASSESSMENT — AXIALLENGTH_DERIVED: OD_AL: 23.5489

## 2023-08-17 ASSESSMENT — SPHEQUIV_DERIVED: OD_SPHEQUIV: -0.125

## 2023-08-17 ASSESSMENT — TONOMETRY
OS_IOP_MMHG: 14
OD_IOP_MMHG: 14

## 2023-12-18 ENCOUNTER — APPOINTMENT (OUTPATIENT)
Dept: UROLOGY | Facility: CLINIC | Age: 63
End: 2023-12-18

## 2024-01-02 ENCOUNTER — OFFICE (OUTPATIENT)
Dept: URBAN - METROPOLITAN AREA CLINIC 77 | Facility: CLINIC | Age: 64
Setting detail: OPHTHALMOLOGY
End: 2024-01-02
Payer: COMMERCIAL

## 2024-01-02 DIAGNOSIS — H35.22: ICD-10-CM

## 2024-01-02 DIAGNOSIS — H33.012: ICD-10-CM

## 2024-01-02 DIAGNOSIS — H47.011: ICD-10-CM

## 2024-01-02 DIAGNOSIS — H33.311: ICD-10-CM

## 2024-01-02 DIAGNOSIS — H47.10: ICD-10-CM

## 2024-01-02 PROCEDURE — 92250 FUNDUS PHOTOGRAPHY W/I&R: CPT | Performed by: OPHTHALMOLOGY

## 2024-01-02 PROCEDURE — 99214 OFFICE O/P EST MOD 30 MIN: CPT | Performed by: OPHTHALMOLOGY

## 2024-01-02 PROCEDURE — 92083 EXTENDED VISUAL FIELD XM: CPT | Performed by: OPHTHALMOLOGY

## 2024-01-02 ASSESSMENT — LID POSITION - ENTROPION
OS_ENTROPION: 1+
OD_ENTROPION: 1+

## 2024-01-02 ASSESSMENT — CONFRONTATIONAL VISUAL FIELD TEST (CVF)
OS_FINDINGS: FULL
OD_FINDINGS: FULL

## 2024-01-03 ENCOUNTER — APPOINTMENT (OUTPATIENT)
Dept: UROLOGY | Facility: CLINIC | Age: 64
End: 2024-01-03
Payer: COMMERCIAL

## 2024-01-03 VITALS
WEIGHT: 185 LBS | BODY MASS INDEX: 27.4 KG/M2 | HEIGHT: 69 IN | SYSTOLIC BLOOD PRESSURE: 134 MMHG | DIASTOLIC BLOOD PRESSURE: 82 MMHG

## 2024-01-03 PROCEDURE — 99215 OFFICE O/P EST HI 40 MIN: CPT

## 2024-01-03 ASSESSMENT — REFRACTION_CURRENTRX
OS_OVR_VA: 20/
OD_OVR_VA: 20/
OD_SPHERE: +2.50
OD_VPRISM_DIRECTION: SV

## 2024-01-03 ASSESSMENT — REFRACTION_AUTOREFRACTION
OD_AXIS: 031
OS_SPHERE: UNABLE
OD_SPHERE: +0.25
OD_CYLINDER: -0.75

## 2024-01-03 ASSESSMENT — SPHEQUIV_DERIVED: OD_SPHEQUIV: -0.125

## 2024-01-03 NOTE — HISTORY OF PRESENT ILLNESS
[FreeTextEntry1] : 62 yo M for consultation recently saw Dr. Wilkinson here to discuss treatment options for a kidney stone  family history of nephrolithiasis no previous personal history of nephrolithiasis reviewed CT from last year and recent KUB:  - 13 mm left renal pelvis stone - HU ~ 1500  discussed options with the patient ESWL has best success with smaller, less dense stones, with short skin to stone distance, and with favorable location of the stone within the system. URS is more a successful treatment with multiple stones, more dense stones, challenging body habitus, or stone location. PCNL is best for larger, more dense and complex stones, particularly those involving the lower pole.  patient interested in PCNL and mini-PCNL mainly for the chance of not needing a stent discussed mini-PCNL in details Procedure, in hospital stay and recovery discussed with the patient. Potential complications of bleeding, transfusion, selective angioembolization if indicated, adjacent organ injury, fever, sepsis, infection, incomplete stone clearance, bowel or other unusual injury. chest complications, vascular injuries, procedures needed to address any complications, ureteral injury, anesthetic complications, all discussed.  Printed information including all of the above and more provided to the patient.  answered all questions the patient had  plan: - left side mini-PCNL, will attempt with no stent - future metabolic evaluation

## 2024-01-03 NOTE — ASSESSMENT
[FreeTextEntry1] :  plan: - left side mini-PCNL, will attempt with no stent - future metabolic evaluation

## 2024-01-22 ENCOUNTER — OUTPATIENT (OUTPATIENT)
Dept: OUTPATIENT SERVICES | Facility: HOSPITAL | Age: 64
LOS: 1 days | End: 2024-01-22
Payer: COMMERCIAL

## 2024-01-22 VITALS
WEIGHT: 192.02 LBS | HEIGHT: 71 IN | HEART RATE: 48 BPM | TEMPERATURE: 97 F | SYSTOLIC BLOOD PRESSURE: 110 MMHG | RESPIRATION RATE: 20 BRPM | DIASTOLIC BLOOD PRESSURE: 70 MMHG | OXYGEN SATURATION: 99 %

## 2024-01-22 DIAGNOSIS — Z13.89 ENCOUNTER FOR SCREENING FOR OTHER DISORDER: ICD-10-CM

## 2024-01-22 DIAGNOSIS — Z98.49 CATARACT EXTRACTION STATUS, UNSPECIFIED EYE: Chronic | ICD-10-CM

## 2024-01-22 DIAGNOSIS — Z98.890 OTHER SPECIFIED POSTPROCEDURAL STATES: Chronic | ICD-10-CM

## 2024-01-22 DIAGNOSIS — N20.0 CALCULUS OF KIDNEY: ICD-10-CM

## 2024-01-22 DIAGNOSIS — Z01.818 ENCOUNTER FOR OTHER PREPROCEDURAL EXAMINATION: ICD-10-CM

## 2024-01-22 DIAGNOSIS — Z29.9 ENCOUNTER FOR PROPHYLACTIC MEASURES, UNSPECIFIED: ICD-10-CM

## 2024-01-22 LAB
A1C WITH ESTIMATED AVERAGE GLUCOSE RESULT: 5.5 % — SIGNIFICANT CHANGE UP (ref 4–5.6)
ANION GAP SERPL CALC-SCNC: 9 MMOL/L — SIGNIFICANT CHANGE UP (ref 5–17)
APPEARANCE UR: CLEAR — SIGNIFICANT CHANGE UP
BACTERIA # UR AUTO: NEGATIVE /HPF — SIGNIFICANT CHANGE UP
BASOPHILS # BLD AUTO: 0.07 K/UL — SIGNIFICANT CHANGE UP (ref 0–0.2)
BASOPHILS NFR BLD AUTO: 1.2 % — SIGNIFICANT CHANGE UP (ref 0–2)
BILIRUB UR-MCNC: NEGATIVE — SIGNIFICANT CHANGE UP
BLD GP AB SCN SERPL QL: SIGNIFICANT CHANGE UP
BUN SERPL-MCNC: 18.3 MG/DL — SIGNIFICANT CHANGE UP (ref 8–20)
CALCIUM SERPL-MCNC: 9.2 MG/DL — SIGNIFICANT CHANGE UP (ref 8.4–10.5)
CAST: 1 /LPF — SIGNIFICANT CHANGE UP (ref 0–4)
CHLORIDE SERPL-SCNC: 97 MMOL/L — SIGNIFICANT CHANGE UP (ref 96–108)
CO2 SERPL-SCNC: 27 MMOL/L — SIGNIFICANT CHANGE UP (ref 22–29)
COLOR SPEC: YELLOW — SIGNIFICANT CHANGE UP
CREAT SERPL-MCNC: 0.84 MG/DL — SIGNIFICANT CHANGE UP (ref 0.5–1.3)
DIFF PNL FLD: ABNORMAL
EGFR: 98 ML/MIN/1.73M2 — SIGNIFICANT CHANGE UP
EOSINOPHIL # BLD AUTO: 0.08 K/UL — SIGNIFICANT CHANGE UP (ref 0–0.5)
EOSINOPHIL NFR BLD AUTO: 1.4 % — SIGNIFICANT CHANGE UP (ref 0–6)
ESTIMATED AVERAGE GLUCOSE: 111 MG/DL — SIGNIFICANT CHANGE UP (ref 68–114)
GLUCOSE SERPL-MCNC: 93 MG/DL — SIGNIFICANT CHANGE UP (ref 70–99)
GLUCOSE UR QL: NEGATIVE MG/DL — SIGNIFICANT CHANGE UP
HCT VFR BLD CALC: 44.8 % — SIGNIFICANT CHANGE UP (ref 39–50)
HGB BLD-MCNC: 14.3 G/DL — SIGNIFICANT CHANGE UP (ref 13–17)
IMM GRANULOCYTES NFR BLD AUTO: 0.3 % — SIGNIFICANT CHANGE UP (ref 0–0.9)
KETONES UR-MCNC: NEGATIVE MG/DL — SIGNIFICANT CHANGE UP
LEUKOCYTE ESTERASE UR-ACNC: NEGATIVE — SIGNIFICANT CHANGE UP
LYMPHOCYTES # BLD AUTO: 1.92 K/UL — SIGNIFICANT CHANGE UP (ref 1–3.3)
LYMPHOCYTES # BLD AUTO: 33.2 % — SIGNIFICANT CHANGE UP (ref 13–44)
MCHC RBC-ENTMCNC: 27.1 PG — SIGNIFICANT CHANGE UP (ref 27–34)
MCHC RBC-ENTMCNC: 31.9 GM/DL — LOW (ref 32–36)
MCV RBC AUTO: 85 FL — SIGNIFICANT CHANGE UP (ref 80–100)
MONOCYTES # BLD AUTO: 0.72 K/UL — SIGNIFICANT CHANGE UP (ref 0–0.9)
MONOCYTES NFR BLD AUTO: 12.5 % — SIGNIFICANT CHANGE UP (ref 2–14)
NEUTROPHILS # BLD AUTO: 2.97 K/UL — SIGNIFICANT CHANGE UP (ref 1.8–7.4)
NEUTROPHILS NFR BLD AUTO: 51.4 % — SIGNIFICANT CHANGE UP (ref 43–77)
NITRITE UR-MCNC: NEGATIVE — SIGNIFICANT CHANGE UP
PH UR: 5.5 — SIGNIFICANT CHANGE UP (ref 5–8)
PLATELET # BLD AUTO: 260 K/UL — SIGNIFICANT CHANGE UP (ref 150–400)
POTASSIUM SERPL-MCNC: 4.5 MMOL/L — SIGNIFICANT CHANGE UP (ref 3.5–5.3)
POTASSIUM SERPL-SCNC: 4.5 MMOL/L — SIGNIFICANT CHANGE UP (ref 3.5–5.3)
PROT UR-MCNC: 30 MG/DL
RBC # BLD: 5.27 M/UL — SIGNIFICANT CHANGE UP (ref 4.2–5.8)
RBC # FLD: 12.6 % — SIGNIFICANT CHANGE UP (ref 10.3–14.5)
RBC CASTS # UR COMP ASSIST: 31 /HPF — HIGH (ref 0–4)
SODIUM SERPL-SCNC: 133 MMOL/L — LOW (ref 135–145)
SP GR SPEC: 1.02 — SIGNIFICANT CHANGE UP (ref 1–1.03)
SQUAMOUS # UR AUTO: 0 /HPF — SIGNIFICANT CHANGE UP (ref 0–5)
UROBILINOGEN FLD QL: 0.2 MG/DL — SIGNIFICANT CHANGE UP (ref 0.2–1)
WBC # BLD: 5.78 K/UL — SIGNIFICANT CHANGE UP (ref 3.8–10.5)
WBC # FLD AUTO: 5.78 K/UL — SIGNIFICANT CHANGE UP (ref 3.8–10.5)
WBC UR QL: 3 /HPF — SIGNIFICANT CHANGE UP (ref 0–5)

## 2024-01-22 PROCEDURE — 93010 ELECTROCARDIOGRAM REPORT: CPT

## 2024-01-22 PROCEDURE — G0463: CPT

## 2024-01-22 PROCEDURE — 93005 ELECTROCARDIOGRAM TRACING: CPT

## 2024-01-22 RX ORDER — SODIUM CHLORIDE 9 MG/ML
3 INJECTION INTRAMUSCULAR; INTRAVENOUS; SUBCUTANEOUS ONCE
Refills: 0 | Status: COMPLETED | OUTPATIENT
Start: 2024-01-30 | End: 2024-01-30

## 2024-01-22 RX ORDER — CEFAZOLIN SODIUM 1 G
2000 VIAL (EA) INJECTION ONCE
Refills: 0 | Status: DISCONTINUED | OUTPATIENT
Start: 2024-01-30 | End: 2024-01-30

## 2024-01-22 RX ORDER — ACETAMINOPHEN 500 MG
975 TABLET ORAL ONCE
Refills: 0 | Status: COMPLETED | OUTPATIENT
Start: 2024-01-30 | End: 2024-01-30

## 2024-01-22 NOTE — H&P PST ADULT - CARDIOVASCULAR
details… regular rate and rhythm/S1 S2 present/no gallops/no rub/no murmur/no JVD/no pedal edema S1 S2 present/no gallops/no rub/no murmur/no JVD/no pedal edema/bradycardia

## 2024-01-22 NOTE — H&P PST ADULT - NSICDXFAMILYHX_GEN_ALL_CORE_FT
FAMILY HISTORY:  Family history of nephrolithiasis    Father  Still living? Unknown  Family history of lung cancer, Age at diagnosis: Age Unknown    Grandparent  Still living? Unknown  Family history of lung cancer, Age at diagnosis: Age Unknown     FAMILY HISTORY:  Father  Still living? Unknown  Family history of lung cancer, Age at diagnosis: Age Unknown  Family history of nephrolithiasis, Age at diagnosis: Age Unknown    Mother  Still living? Unknown  Family history of nephrolithiasis, Age at diagnosis: Age Unknown    Grandparent  Still living? Unknown  Family history of lung cancer, Age at diagnosis: Age Unknown    Aunt  Still living? Unknown  Family history of nephrolithiasis, Age at diagnosis: Age Unknown

## 2024-01-22 NOTE — H&P PST ADULT - ASSESSMENT
62 yo M presents with c/o kidney stone. Reports FHx of nephrolithiasis, denies previous personal history of nephrolithiasis. CT from last year and recent KUB: 13 mm left renal pelvis stone. Scheduled for left sided mini-PCNL w/Dr Giles on 2024, pending medical clearance    Patient was educated on preop preparation with written and verbal instructions. Pt was informed to obtain clearances >3 days before surgery. Pt will review medications with PCP. Pt was educated on NSAIDs, multivitamins and herbals that increase the risk of bleeding and need to be stopped 7 days before procedure. Pt verbalized understanding of the above.     OPIOID RISK TOOL    MANJINDER EACH BOX THAT APPLIES AND ADD TOTALS AT THE END    FAMILY HISTORY OF SUBSTANCE ABUSE                 FEMALE         MALE                                                Alcohol                             [  ]1 pt          [  ]3pts                                               Illegal Durgs                     [  ]2 pts        [  ]3pts                                               Rx Drugs                           [  ]4 pts        [  ]4 pts    PERSONAL HISTORY OF SUBSTANCE ABUSE                                                                                          Alcohol                             [  ]3 pts       [  ]3 pts                                               Illegal Drugs                     [  ]4 pts        [  ]4 pts                                               Rx Drugs                           [  ]5 pts        [  ]5 pts    AGE BETWEEN 16-45 YEARS                                      [  ]1 pt         [  ]1 pt    HISTORY OF PREADOLESCENT   SEXUAL ABUSE                                                             [  ]3 pts        [  ]0pts    PSYCHOLOGICAL DISEASE                     ADD, OCD, Bipolar, Schizophrenia        [  ]2 pts         [  ]2 pts                      Depression                                               [  ]1 pt           [  ]1 pt           SCORING TOTAL   (add numbers and type here)              ( 1 )                                     A score of 3 or lower indicated LOW risk for future opioid abuse  A score of 4 to 7 indicated moderate risk for future opioid abuse  A score of 8 or higher indicates a high risk for opioid abuse    CAPRINI VTE 2.0 SCORE [CLOT updated 2019]    AGE RELATED RISK FACTORS                                                       MOBILITY RELATED FACTORS  [ ] Age 41-60 years                                            (1 Point)                    [ ] Bed rest                                                        (1 Point)  [x ] Age: 61-74 years                                           (2 Points)                  [ ] Plaster cast                                                   (2 Points)  [ ] Age= 75 years                                              (3 Points)                    [ ] Bed bound for more than 72 hours                 (2 Points)    DISEASE RELATED RISK FACTORS                                               GENDER SPECIFIC FACTORS  [ ] Edema in the lower extremities                       (1 Point)              [ ] Pregnancy                                                     (1 Point)  [ ] Varicose veins                                               (1 Point)                     [ ] Post-partum < 6 weeks                                   (1 Point)             [x ] BMI > 25 Kg/m2                                            (1 Point)                     [ ] Hormonal therapy  or oral contraception          (1 Point)                 [ ] Sepsis (in the previous month)                        (1 Point)               [ ] History of pregnancy complications                 (1 point)  [ ] Pneumonia or serious lung disease                                               [ ] Unexplained or recurrent                     (1 Point)           (in the previous month)                               (1 Point)  [ ] Abnormal pulmonary function test                     (1 Point)                 SURGERY RELATED RISK FACTORS  [ ] Acute myocardial infarction                              (1 Point)               [ ]  Section                                             (1 Point)  [ ] Congestive heart failure (in the previous month)  (1 Point)      [ ] Minor surgery                                                  (1 Point)   [ ] Inflammatory bowel disease                             (1 Point)               [ ] Arthroscopic surgery                                        (2 Points)  [ ] Central venous access                                      (2 Points)                [ x] General surgery lasting more than 45 minutes (2 points)  [ ] Malignancy- Present or previous                   (2 Points)                [ ] Elective arthroplasty                                         (5 points)    [ ] Stroke (in the previous month)                          (5 Points)                                                                                                                                                           HEMATOLOGY RELATED FACTORS                                                 TRAUMA RELATED RISK FACTORS  [ ] Prior episodes of VTE                                     (3 Points)                [ ] Fracture of the hip, pelvis, or leg                       (5 Points)  [ ] Positive family history for VTE                         (3 Points)             [ ] Acute spinal cord injury (in the previous month)  (5 Points)  [ ] Prothrombin 38419 A                                     (3 Points)               [ ] Paralysis  (less than 1 month)                             (5 Points)  [ ] Factor V Leiden                                             (3 Points)                  [ ] Multiple Trauma within 1 month                        (5 Points)  [ ] Lupus anticoagulants                                     (3 Points)                                                           [ ] Anticardiolipin antibodies                               (3 Points)                                                       [ ] High homocysteine in the blood                      (3 Points)                                             [ ] Other congenital or acquired thrombophilia      (3 Points)                                                [ ] Heparin induced thrombocytopenia                  (3 Points)                                     Total Score [     5    ] 64 yo M presents with c/o kidney stone. Reports FHx of nephrolithiasis, denies previous personal history of nephrolithiasis. CT from last year and recent KUB: 13 mm left renal pelvis stone. Scheduled for left sided mini-PCNL w/Dr Giles on 2024, pending medical clearance    Patient was educated on preop preparation with written and verbal instructions. Pt was informed to obtain clearances >3 days before surgery. Pt will review medications with PCP. Pt was educated on NSAIDs, multivitamins and herbals that increase the risk of bleeding and need to be stopped 7 days before procedure. Pt verbalized understanding of the above.     OPIOID RISK TOOL    MANJINDER EACH BOX THAT APPLIES AND ADD TOTALS AT THE END    FAMILY HISTORY OF SUBSTANCE ABUSE                 FEMALE         MALE                                                Alcohol                             [  ]1 pt          [  ]3pts                                               Illegal Drugs                     [  ]2 pts        [  ]3pts                                               Rx Drugs                           [  ]4 pts        [  ]4 pts    PERSONAL HISTORY OF SUBSTANCE ABUSE                                                                                          Alcohol                             [  ]3 pts       [  ]3 pts                                               Illegal Drugs                     [  ]4 pts        [  ]4 pts                                               Rx Drugs                           [  ]5 pts        [  ]5 pts    AGE BETWEEN 16-45 YEARS                                      [  ]1 pt         [  ]1 pt    HISTORY OF PREADOLESCENT   SEXUAL ABUSE                                                             [  ]3 pts        [  ]0pts    PSYCHOLOGICAL DISEASE                     ADD, OCD, Bipolar, Schizophrenia        [  ]2 pts         [  ]2 pts                      Depression                                               [  ]1 pt           [  ]1 pt           SCORING TOTAL   (add numbers and type here)              ( 1 )                                     A score of 3 or lower indicated LOW risk for future opioid abuse  A score of 4 to 7 indicated moderate risk for future opioid abuse  A score of 8 or higher indicates a high risk for opioid abuse    CAPRINI VTE 2.0 SCORE [CLOT updated 2019]    AGE RELATED RISK FACTORS                                                       MOBILITY RELATED FACTORS  [ ] Age 41-60 years                                            (1 Point)                    [ ] Bed rest                                                        (1 Point)  [x ] Age: 61-74 years                                           (2 Points)                  [ ] Plaster cast                                                   (2 Points)  [ ] Age= 75 years                                              (3 Points)                    [ ] Bed bound for more than 72 hours                 (2 Points)    DISEASE RELATED RISK FACTORS                                               GENDER SPECIFIC FACTORS  [ ] Edema in the lower extremities                       (1 Point)              [ ] Pregnancy                                                     (1 Point)  [ ] Varicose veins                                               (1 Point)                     [ ] Post-partum < 6 weeks                                   (1 Point)             [x ] BMI > 25 Kg/m2                                            (1 Point)                     [ ] Hormonal therapy  or oral contraception          (1 Point)                 [ ] Sepsis (in the previous month)                        (1 Point)               [ ] History of pregnancy complications                 (1 point)  [ ] Pneumonia or serious lung disease                                               [ ] Unexplained or recurrent                     (1 Point)           (in the previous month)                               (1 Point)  [ ] Abnormal pulmonary function test                     (1 Point)                 SURGERY RELATED RISK FACTORS  [ ] Acute myocardial infarction                              (1 Point)               [ ]  Section                                             (1 Point)  [ ] Congestive heart failure (in the previous month)  (1 Point)      [ ] Minor surgery                                                  (1 Point)   [ ] Inflammatory bowel disease                             (1 Point)               [ ] Arthroscopic surgery                                        (2 Points)  [ ] Central venous access                                      (2 Points)                [ x] General surgery lasting more than 45 minutes (2 points)  [ ] Malignancy- Present or previous                   (2 Points)                [ ] Elective arthroplasty                                         (5 points)    [ ] Stroke (in the previous month)                          (5 Points)                                                                                                                                                           HEMATOLOGY RELATED FACTORS                                                 TRAUMA RELATED RISK FACTORS  [ ] Prior episodes of VTE                                     (3 Points)                [ ] Fracture of the hip, pelvis, or leg                       (5 Points)  [ ] Positive family history for VTE                         (3 Points)             [ ] Acute spinal cord injury (in the previous month)  (5 Points)  [ ] Prothrombin 76528 A                                     (3 Points)               [ ] Paralysis  (less than 1 month)                             (5 Points)  [ ] Factor V Leiden                                             (3 Points)                  [ ] Multiple Trauma within 1 month                        (5 Points)  [ ] Lupus anticoagulants                                     (3 Points)                                                           [ ] Anticardiolipin antibodies                               (3 Points)                                                       [ ] High homocysteine in the blood                      (3 Points)                                             [ ] Other congenital or acquired thrombophilia      (3 Points)                                                [ ] Heparin induced thrombocytopenia                  (3 Points)                                     Total Score [     5    ] 64 yo M PMH of depression, pernicious anemia, presents with c/o left sided kidney stone. Reports FHx of nephrolithiasis (aunt and parents), denies previous personal history of nephrolithiasis. CT from last year and recent KUB: 13 mm left renal pelvis stone. Today patient reports denies fevers, chills, dysuria, hematuria, flank pain. Scheduled for left sided mini percutaneous nephrolithotomy w/Dr Giles on 2024, pending medical clearance    Patient was educated on preop preparation with written and verbal instructions. Pt was informed to obtain clearances >3 days before surgery. Pt will review medications with PCP. Pt was educated on NSAIDs, multivitamins and herbals that increase the risk of bleeding and need to be stopped 7 days before procedure. Pt verbalized understanding of the above.     OPIOID RISK TOOL    MANJINDER EACH BOX THAT APPLIES AND ADD TOTALS AT THE END    FAMILY HISTORY OF SUBSTANCE ABUSE                 FEMALE         MALE                                                Alcohol                             [  ]1 pt          [  ]3pts                                               Illegal Drugs                     [  ]2 pts        [  ]3pts                                               Rx Drugs                           [  ]4 pts        [  ]4 pts    PERSONAL HISTORY OF SUBSTANCE ABUSE                                                                                          Alcohol                             [  ]3 pts       [  ]3 pts                                               Illegal Drugs                     [  ]4 pts        [  ]4 pts                                               Rx Drugs                           [  ]5 pts        [  ]5 pts    AGE BETWEEN 16-45 YEARS                                      [  ]1 pt         [  ]1 pt    HISTORY OF PREADOLESCENT   SEXUAL ABUSE                                                             [  ]3 pts        [  ]0pts    PSYCHOLOGICAL DISEASE                     ADD, OCD, Bipolar, Schizophrenia        [  ]2 pts         [  ]2 pts                      Depression                                               [  ]1 pt           [  ]1 pt           SCORING TOTAL   (add numbers and type here)              ( 1 )                                     A score of 3 or lower indicated LOW risk for future opioid abuse  A score of 4 to 7 indicated moderate risk for future opioid abuse  A score of 8 or higher indicates a high risk for opioid abuse    CAPRINI VTE 2.0 SCORE [CLOT updated 2019]    AGE RELATED RISK FACTORS                                                       MOBILITY RELATED FACTORS  [ ] Age 41-60 years                                            (1 Point)                    [ ] Bed rest                                                        (1 Point)  [x ] Age: 61-74 years                                           (2 Points)                  [ ] Plaster cast                                                   (2 Points)  [ ] Age= 75 years                                              (3 Points)                    [ ] Bed bound for more than 72 hours                 (2 Points)    DISEASE RELATED RISK FACTORS                                               GENDER SPECIFIC FACTORS  [ ] Edema in the lower extremities                       (1 Point)              [ ] Pregnancy                                                     (1 Point)  [ ] Varicose veins                                               (1 Point)                     [ ] Post-partum < 6 weeks                                   (1 Point)             [x ] BMI > 25 Kg/m2                                            (1 Point)                     [ ] Hormonal therapy  or oral contraception          (1 Point)                 [ ] Sepsis (in the previous month)                        (1 Point)               [ ] History of pregnancy complications                 (1 point)  [ ] Pneumonia or serious lung disease                                               [ ] Unexplained or recurrent                     (1 Point)           (in the previous month)                               (1 Point)  [ ] Abnormal pulmonary function test                     (1 Point)                 SURGERY RELATED RISK FACTORS  [ ] Acute myocardial infarction                              (1 Point)               [ ]  Section                                             (1 Point)  [ ] Congestive heart failure (in the previous month)  (1 Point)      [ ] Minor surgery                                                  (1 Point)   [ ] Inflammatory bowel disease                             (1 Point)               [ ] Arthroscopic surgery                                        (2 Points)  [ ] Central venous access                                      (2 Points)                [ x] General surgery lasting more than 45 minutes (2 points)  [ ] Malignancy- Present or previous                   (2 Points)                [ ] Elective arthroplasty                                         (5 points)    [ ] Stroke (in the previous month)                          (5 Points)                                                                                                                                                           HEMATOLOGY RELATED FACTORS                                                 TRAUMA RELATED RISK FACTORS  [ ] Prior episodes of VTE                                     (3 Points)                [ ] Fracture of the hip, pelvis, or leg                       (5 Points)  [ ] Positive family history for VTE                         (3 Points)             [ ] Acute spinal cord injury (in the previous month)  (5 Points)  [ ] Prothrombin 11381 A                                     (3 Points)               [ ] Paralysis  (less than 1 month)                             (5 Points)  [ ] Factor V Leiden                                             (3 Points)                  [ ] Multiple Trauma within 1 month                        (5 Points)  [ ] Lupus anticoagulants                                     (3 Points)                                                           [ ] Anticardiolipin antibodies                               (3 Points)                                                       [ ] High homocysteine in the blood                      (3 Points)                                             [ ] Other congenital or acquired thrombophilia      (3 Points)                                                [ ] Heparin induced thrombocytopenia                  (3 Points)                                     Total Score [     5    ] 62 yo M PMH of depression, pernicious anemia, presents with c/o left sided kidney stone. Reports FHx of nephrolithiasis (aunt and parents), denies previous personal history of nephrolithiasis. CT from last year and recent KUB: 13 mm left renal pelvis stone. Today patient denies fevers, chills, dysuria, hematuria, flank pain. Scheduled for left sided mini percutaneous nephrolithotomy w/Dr Giles on 2024, pending medical clearance    Patient was educated on preop preparation with written and verbal instructions. Pt was informed to obtain medical clearance >3 days before surgery. Pt was educated on NSAIDs, multivitamins and herbals that increase the risk of bleeding and need to be stopped 7 days before procedure. Pt verbalized understanding of the above.     OPIOID RISK TOOL    MANJINDER EACH BOX THAT APPLIES AND ADD TOTALS AT THE END    FAMILY HISTORY OF SUBSTANCE ABUSE                 FEMALE         MALE                                                Alcohol                             [  ]1 pt          [  ]3pts                                               Illegal Drugs                     [  ]2 pts        [  ]3pts                                               Rx Drugs                           [  ]4 pts        [  ]4 pts    PERSONAL HISTORY OF SUBSTANCE ABUSE                                                                                          Alcohol                             [  ]3 pts       [  ]3 pts                                               Illegal Drugs                     [  ]4 pts        [  ]4 pts                                               Rx Drugs                           [  ]5 pts        [  ]5 pts    AGE BETWEEN 16-45 YEARS                                      [  ]1 pt         [  ]1 pt    HISTORY OF PREADOLESCENT   SEXUAL ABUSE                                                             [  ]3 pts        [  ]0pts    PSYCHOLOGICAL DISEASE                     ADD, OCD, Bipolar, Schizophrenia        [  ]2 pts         [  ]2 pts                      Depression                                               [  ]1 pt           [  ]1 pt           SCORING TOTAL   (add numbers and type here)              ( 1 )                                     A score of 3 or lower indicated LOW risk for future opioid abuse  A score of 4 to 7 indicated moderate risk for future opioid abuse  A score of 8 or higher indicates a high risk for opioid abuse    CAPRINI VTE 2.0 SCORE [CLOT updated 2019]    AGE RELATED RISK FACTORS                                                       MOBILITY RELATED FACTORS  [ ] Age 41-60 years                                            (1 Point)                    [ ] Bed rest                                                        (1 Point)  [x ] Age: 61-74 years                                           (2 Points)                  [ ] Plaster cast                                                   (2 Points)  [ ] Age= 75 years                                              (3 Points)                    [ ] Bed bound for more than 72 hours                 (2 Points)    DISEASE RELATED RISK FACTORS                                               GENDER SPECIFIC FACTORS  [ ] Edema in the lower extremities                       (1 Point)              [ ] Pregnancy                                                     (1 Point)  [ ] Varicose veins                                               (1 Point)                     [ ] Post-partum < 6 weeks                                   (1 Point)             [x ] BMI > 25 Kg/m2                                            (1 Point)                     [ ] Hormonal therapy  or oral contraception          (1 Point)                 [ ] Sepsis (in the previous month)                        (1 Point)               [ ] History of pregnancy complications                 (1 point)  [ ] Pneumonia or serious lung disease                                               [ ] Unexplained or recurrent                     (1 Point)           (in the previous month)                               (1 Point)  [ ] Abnormal pulmonary function test                     (1 Point)                 SURGERY RELATED RISK FACTORS  [ ] Acute myocardial infarction                              (1 Point)               [ ]  Section                                             (1 Point)  [ ] Congestive heart failure (in the previous month)  (1 Point)      [ ] Minor surgery                                                  (1 Point)   [ ] Inflammatory bowel disease                             (1 Point)               [ ] Arthroscopic surgery                                        (2 Points)  [ ] Central venous access                                      (2 Points)                [ x] General surgery lasting more than 45 minutes (2 points)  [ ] Malignancy- Present or previous                   (2 Points)                [ ] Elective arthroplasty                                         (5 points)    [ ] Stroke (in the previous month)                          (5 Points)                                                                                                                                                           HEMATOLOGY RELATED FACTORS                                                 TRAUMA RELATED RISK FACTORS  [ ] Prior episodes of VTE                                     (3 Points)                [ ] Fracture of the hip, pelvis, or leg                       (5 Points)  [ ] Positive family history for VTE                         (3 Points)             [ ] Acute spinal cord injury (in the previous month)  (5 Points)  [ ] Prothrombin 89311 A                                     (3 Points)               [ ] Paralysis  (less than 1 month)                             (5 Points)  [ ] Factor V Leiden                                             (3 Points)                  [ ] Multiple Trauma within 1 month                        (5 Points)  [ ] Lupus anticoagulants                                     (3 Points)                                                           [ ] Anticardiolipin antibodies                               (3 Points)                                                       [ ] High homocysteine in the blood                      (3 Points)                                             [ ] Other congenital or acquired thrombophilia      (3 Points)                                                [ ] Heparin induced thrombocytopenia                  (3 Points)                                     Total Score [     5    ]

## 2024-01-22 NOTE — H&P PST ADULT - OTHER CARE PROVIDERS
PCP Dr Anibal Thakkar PCP Dr Anibal Duong, hematologist Dr Milan PCP Dr Prisca Gastelum at [facility] in [place] [phone], hematologist Dr Milan

## 2024-01-22 NOTE — H&P PST ADULT - MUSCULOSKELETAL
negative ROM intact/no calf tenderness/normal gait/strength 5/5 bilateral upper extremities/strength 5/5 bilateral lower extremities details…

## 2024-01-22 NOTE — H&P PST ADULT - NSICDXPASTSURGICALHX_GEN_ALL_CORE_FT
PAST SURGICAL HISTORY:  H/O colonoscopy      PAST SURGICAL HISTORY:  H/O colonoscopy     H/O endoscopy      PAST SURGICAL HISTORY:  H/O colonoscopy     H/O endoscopy     History of cataract surgery

## 2024-01-22 NOTE — H&P PST ADULT - NSICDXPASTMEDICALHX_GEN_ALL_CORE_FT
PAST MEDICAL HISTORY:  Calculus of kidney     HLD (hyperlipidemia)     Hypothyroidism      PAST MEDICAL HISTORY:  Calculus of kidney     H/O: depression     HLD (hyperlipidemia)     Hypothyroidism     Left retinal detachment

## 2024-01-22 NOTE — H&P PST ADULT - PSYCHIATRIC
normal affect/alert and oriented x3/normal behavior details… normal affect/alert and oriented x3/anxious

## 2024-01-23 LAB
CULTURE RESULTS: SIGNIFICANT CHANGE UP
SPECIMEN SOURCE: SIGNIFICANT CHANGE UP

## 2024-01-27 ENCOUNTER — OFFICE (OUTPATIENT)
Dept: URBAN - METROPOLITAN AREA CLINIC 115 | Facility: CLINIC | Age: 64
Setting detail: OPHTHALMOLOGY
End: 2024-01-27
Payer: COMMERCIAL

## 2024-01-27 ENCOUNTER — RX ONLY (RX ONLY)
Age: 64
End: 2024-01-27

## 2024-01-27 DIAGNOSIS — H43.813: ICD-10-CM

## 2024-01-27 DIAGNOSIS — H02.032: ICD-10-CM

## 2024-01-27 DIAGNOSIS — H47.011: ICD-10-CM

## 2024-01-27 DIAGNOSIS — H33.012: ICD-10-CM

## 2024-01-27 DIAGNOSIS — H31.001: ICD-10-CM

## 2024-01-27 DIAGNOSIS — H35.22: ICD-10-CM

## 2024-01-27 DIAGNOSIS — H53.433: ICD-10-CM

## 2024-01-27 DIAGNOSIS — H02.035: ICD-10-CM

## 2024-01-27 DIAGNOSIS — Z96.1: ICD-10-CM

## 2024-01-27 DIAGNOSIS — H47.10: ICD-10-CM

## 2024-01-27 DIAGNOSIS — H33.311: ICD-10-CM

## 2024-01-27 DIAGNOSIS — H47.321: ICD-10-CM

## 2024-01-27 PROCEDURE — 92020 GONIOSCOPY: CPT | Performed by: OPHTHALMOLOGY

## 2024-01-27 PROCEDURE — 92014 COMPRE OPH EXAM EST PT 1/>: CPT | Performed by: OPHTHALMOLOGY

## 2024-01-27 PROCEDURE — 92083 EXTENDED VISUAL FIELD XM: CPT | Performed by: OPHTHALMOLOGY

## 2024-01-27 PROCEDURE — 92133 CPTRZD OPH DX IMG PST SGM ON: CPT | Performed by: OPHTHALMOLOGY

## 2024-01-27 ASSESSMENT — LID POSITION - ENTROPION
OS_ENTROPION: 1+
OD_ENTROPION: 1+

## 2024-01-27 ASSESSMENT — REFRACTION_AUTOREFRACTION
OS_SPHERE: UNABLE
OD_SPHERE: +0.50

## 2024-01-27 ASSESSMENT — REFRACTION_CURRENTRX
OD_SPHERE: +2.50
OD_OVR_VA: 20/
OS_OVR_VA: 20/
OD_VPRISM_DIRECTION: SV

## 2024-01-29 ENCOUNTER — TRANSCRIPTION ENCOUNTER (OUTPATIENT)
Age: 64
End: 2024-01-29

## 2024-01-29 NOTE — ASU PATIENT PROFILE, ADULT - NSICDXPASTMEDICALHX_GEN_ALL_CORE_FT
PAST MEDICAL HISTORY:  Calculus of kidney     H/O: depression     HLD (hyperlipidemia)     Hypothyroidism     Left retinal detachment

## 2024-01-30 ENCOUNTER — RESULT REVIEW (OUTPATIENT)
Age: 64
End: 2024-01-30

## 2024-01-30 ENCOUNTER — TRANSCRIPTION ENCOUNTER (OUTPATIENT)
Age: 64
End: 2024-01-30

## 2024-01-30 ENCOUNTER — APPOINTMENT (OUTPATIENT)
Dept: UROLOGY | Facility: HOSPITAL | Age: 64
End: 2024-01-30

## 2024-01-30 ENCOUNTER — INPATIENT (INPATIENT)
Facility: HOSPITAL | Age: 64
LOS: 0 days | Discharge: HOME | DRG: 661 | End: 2024-01-31
Attending: STUDENT IN AN ORGANIZED HEALTH CARE EDUCATION/TRAINING PROGRAM | Admitting: STUDENT IN AN ORGANIZED HEALTH CARE EDUCATION/TRAINING PROGRAM
Payer: COMMERCIAL

## 2024-01-30 VITALS
RESPIRATION RATE: 16 BRPM | TEMPERATURE: 97 F | WEIGHT: 192.02 LBS | SYSTOLIC BLOOD PRESSURE: 146 MMHG | HEIGHT: 71 IN | DIASTOLIC BLOOD PRESSURE: 83 MMHG | OXYGEN SATURATION: 98 % | HEART RATE: 58 BPM

## 2024-01-30 DIAGNOSIS — Z98.890 OTHER SPECIFIED POSTPROCEDURAL STATES: Chronic | ICD-10-CM

## 2024-01-30 DIAGNOSIS — N20.0 CALCULUS OF KIDNEY: ICD-10-CM

## 2024-01-30 DIAGNOSIS — Z98.49 CATARACT EXTRACTION STATUS, UNSPECIFIED EYE: Chronic | ICD-10-CM

## 2024-01-30 LAB
ANION GAP SERPL CALC-SCNC: 12 MMOL/L — SIGNIFICANT CHANGE UP (ref 5–17)
BUN SERPL-MCNC: 19.3 MG/DL — SIGNIFICANT CHANGE UP (ref 8–20)
CALCIUM SERPL-MCNC: 8.8 MG/DL — SIGNIFICANT CHANGE UP (ref 8.4–10.5)
CHLORIDE SERPL-SCNC: 99 MMOL/L — SIGNIFICANT CHANGE UP (ref 96–108)
CO2 SERPL-SCNC: 22 MMOL/L — SIGNIFICANT CHANGE UP (ref 22–29)
CREAT SERPL-MCNC: 1.02 MG/DL — SIGNIFICANT CHANGE UP (ref 0.5–1.3)
EGFR: 83 ML/MIN/1.73M2 — SIGNIFICANT CHANGE UP
GLUCOSE SERPL-MCNC: 110 MG/DL — HIGH (ref 70–99)
HCT VFR BLD CALC: 42.5 % — SIGNIFICANT CHANGE UP (ref 39–50)
HGB BLD-MCNC: 14.2 G/DL — SIGNIFICANT CHANGE UP (ref 13–17)
MCHC RBC-ENTMCNC: 28.1 PG — SIGNIFICANT CHANGE UP (ref 27–34)
MCHC RBC-ENTMCNC: 33.4 GM/DL — SIGNIFICANT CHANGE UP (ref 32–36)
MCV RBC AUTO: 84 FL — SIGNIFICANT CHANGE UP (ref 80–100)
PLATELET # BLD AUTO: 214 K/UL — SIGNIFICANT CHANGE UP (ref 150–400)
POTASSIUM SERPL-MCNC: 5.1 MMOL/L — SIGNIFICANT CHANGE UP (ref 3.5–5.3)
POTASSIUM SERPL-SCNC: 5.1 MMOL/L — SIGNIFICANT CHANGE UP (ref 3.5–5.3)
RBC # BLD: 5.06 M/UL — SIGNIFICANT CHANGE UP (ref 4.2–5.8)
RBC # FLD: 12.7 % — SIGNIFICANT CHANGE UP (ref 10.3–14.5)
SODIUM SERPL-SCNC: 133 MMOL/L — LOW (ref 135–145)
WBC # BLD: 5.88 K/UL — SIGNIFICANT CHANGE UP (ref 3.8–10.5)
WBC # FLD AUTO: 5.88 K/UL — SIGNIFICANT CHANGE UP (ref 3.8–10.5)

## 2024-01-30 PROCEDURE — 88300 SURGICAL PATH GROSS: CPT | Mod: 26

## 2024-01-30 PROCEDURE — 71045 X-RAY EXAM CHEST 1 VIEW: CPT | Mod: 26

## 2024-01-30 DEVICE — LASER FIBER SOLTIVE 365: Type: IMPLANTABLE DEVICE | Site: LEFT | Status: FUNCTIONAL

## 2024-01-30 DEVICE — URETERAL CATH OPEN END FLEXI-TIP 5FR .038" X 70CM: Type: IMPLANTABLE DEVICE | Site: LEFT | Status: FUNCTIONAL

## 2024-01-30 DEVICE — GUIDEWIRE SENSOR DUAL-FLEX NITINOL STRAIGHT .035" X 150CM: Type: IMPLANTABLE DEVICE | Site: LEFT | Status: FUNCTIONAL

## 2024-01-30 DEVICE — SURGIFLO MATRIX WITH THROMBIN KIT: Type: IMPLANTABLE DEVICE | Site: LEFT | Status: FUNCTIONAL

## 2024-01-30 DEVICE — IMPLANTABLE DEVICE: Type: IMPLANTABLE DEVICE | Site: LEFT | Status: FUNCTIONAL

## 2024-01-30 RX ORDER — CHOLECALCIFEROL (VITAMIN D3) 125 MCG
1 CAPSULE ORAL
Refills: 0 | DISCHARGE

## 2024-01-30 RX ORDER — KETOROLAC TROMETHAMINE 30 MG/ML
1 SYRINGE (ML) INJECTION
Qty: 12 | Refills: 0
Start: 2024-01-30 | End: 2024-02-01

## 2024-01-30 RX ORDER — INFLUENZA VIRUS VACCINE 15; 15; 15; 15 UG/.5ML; UG/.5ML; UG/.5ML; UG/.5ML
0.5 SUSPENSION INTRAMUSCULAR ONCE
Refills: 0 | Status: DISCONTINUED | OUTPATIENT
Start: 2024-01-30 | End: 2024-01-31

## 2024-01-30 RX ORDER — SODIUM CHLORIDE 9 MG/ML
1000 INJECTION, SOLUTION INTRAVENOUS
Refills: 0 | Status: DISCONTINUED | OUTPATIENT
Start: 2024-01-30 | End: 2024-01-30

## 2024-01-30 RX ORDER — KETOROLAC TROMETHAMINE 30 MG/ML
15 SYRINGE (ML) INJECTION ONCE
Refills: 0 | Status: DISCONTINUED | OUTPATIENT
Start: 2024-01-30 | End: 2024-01-30

## 2024-01-30 RX ORDER — TAMSULOSIN HYDROCHLORIDE 0.4 MG/1
1 CAPSULE ORAL
Qty: 15 | Refills: 0
Start: 2024-01-30 | End: 2024-02-13

## 2024-01-30 RX ORDER — ESCITALOPRAM OXALATE 10 MG/1
1 TABLET, FILM COATED ORAL
Refills: 0 | DISCHARGE

## 2024-01-30 RX ORDER — ACETAMINOPHEN 500 MG
2 TABLET ORAL
Qty: 0 | Refills: 0 | DISCHARGE

## 2024-01-30 RX ORDER — CEPHALEXIN 500 MG
1 CAPSULE ORAL
Qty: 12 | Refills: 0
Start: 2024-01-30 | End: 2024-02-02

## 2024-01-30 RX ORDER — HYDROMORPHONE HYDROCHLORIDE 2 MG/ML
0.5 INJECTION INTRAMUSCULAR; INTRAVENOUS; SUBCUTANEOUS
Refills: 0 | Status: DISCONTINUED | OUTPATIENT
Start: 2024-01-30 | End: 2024-01-30

## 2024-01-30 RX ORDER — HYDROMORPHONE HYDROCHLORIDE 2 MG/ML
1 INJECTION INTRAMUSCULAR; INTRAVENOUS; SUBCUTANEOUS
Refills: 0 | Status: DISCONTINUED | OUTPATIENT
Start: 2024-01-30 | End: 2024-01-30

## 2024-01-30 RX ORDER — PREGABALIN 225 MG/1
1000 CAPSULE ORAL
Refills: 0 | DISCHARGE

## 2024-01-30 RX ORDER — ONDANSETRON 8 MG/1
4 TABLET, FILM COATED ORAL ONCE
Refills: 0 | Status: DISCONTINUED | OUTPATIENT
Start: 2024-01-30 | End: 2024-01-30

## 2024-01-30 RX ORDER — CEPHALEXIN 500 MG
500 CAPSULE ORAL THREE TIMES A DAY
Refills: 0 | Status: DISCONTINUED | OUTPATIENT
Start: 2024-01-30 | End: 2024-01-31

## 2024-01-30 RX ADMIN — Medication 15 MILLIGRAM(S): at 13:06

## 2024-01-30 RX ADMIN — Medication 500 MILLIGRAM(S): at 13:21

## 2024-01-30 RX ADMIN — Medication 500 MILLIGRAM(S): at 21:32

## 2024-01-30 RX ADMIN — SODIUM CHLORIDE 3 MILLILITER(S): 9 INJECTION INTRAMUSCULAR; INTRAVENOUS; SUBCUTANEOUS at 07:08

## 2024-01-30 RX ADMIN — Medication 975 MILLIGRAM(S): at 07:04

## 2024-01-30 NOTE — BRIEF OPERATIVE NOTE - NSICDXBRIEFPROCEDURE_GEN_ALL_CORE_FT
PROCEDURES:  Nephrostolithotomy, percutaneous, with lithotripsy, large stone 30-Jan-2024 14:32:13  Susy Giles

## 2024-01-30 NOTE — ASU DISCHARGE PLAN (ADULT/PEDIATRIC) - ASU DC SPECIAL INSTRUCTIONSFT
remove dressing in 48 hrs  you may shower, no baths  cover incision with dry gauze until no further drainage

## 2024-01-30 NOTE — ASU DISCHARGE PLAN (ADULT/PEDIATRIC) - CARE PROVIDER_API CALL
Susy Giles  Urology  [address], [address]  [place] [address]  Phone: [phone]  Fax: [phone]  Follow Up Time: 1 week

## 2024-01-30 NOTE — ASU DISCHARGE PLAN (ADULT/PEDIATRIC) - CALL YOUR DOCTOR IF YOU HAVE ANY OF THE FOLLOWING:
Fever greater than (need to indicate Fahrenheit or Celsius) Pain not relieved by Medications/Fever greater than (need to indicate Fahrenheit or Celsius)/Wound/Surgical Site with redness, or foul smelling discharge or pus/Unable to urinate

## 2024-01-30 NOTE — CHART NOTE - NSCHARTNOTEFT_GEN_A_CORE
POST OPERATIVE CHECK    Subjective: Patient seen at bedside post operatively. Patient complaining of minimal throat pain but denies abdominal pain, N/V/D/F, chills, shortness of breath, chest pain or any other symptoms. Admits voiding and ambulation. States urine output "clearing up".       STATUS POST:  L mini PCNL      MEDICATIONS  (STANDING):  cephalexin 500 milliGRAM(s) Oral three times a day  influenza   Vaccine 0.5 milliLiter(s) IntraMuscular once    MEDICATIONS  (PRN):      Vital Signs Last 24 Hrs  T(C): 36.6 (30 Jan 2024 17:30), Max: 36.6 (30 Jan 2024 17:30)  T(F): 97.8 (30 Jan 2024 17:30), Max: 97.8 (30 Jan 2024 17:30)  HR: 56 (30 Jan 2024 17:30) (56 - 67)  BP: 158/80 (30 Jan 2024 17:30) (130/73 - 158/80)  BP(mean): 87 (30 Jan 2024 17:13) (87 - 103)  RR: 18 (30 Jan 2024 17:30) (14 - 20)  SpO2: 96% (30 Jan 2024 17:30) (96% - 100%)    Parameters below as of 30 Jan 2024 17:30  Patient On (Oxygen Delivery Method): room air        Physical Exam:    Constitutional: resting comfortably in bed, NAD  Respiratory: Respirations non-labored  Gastrointestinal: Soft, non-tender, non-distended  : voiding   Neurological: A&O x 3  Musculoskeletal: moving all 4 extremities spontaneously       LABS:                        14.2   5.88  )-----------( 214      ( 30 Jan 2024 10:00 )             42.5     01-30    133<L>  |  99  |  19.3  ----------------------------<  110<H>  5.1   |  22.0  |  1.02    Ca    8.8      30 Jan 2024 10:00        Urinalysis Basic - ( 30 Jan 2024 10:00 )    Color: x / Appearance: x / SG: x / pH: x  Gluc: 110 mg/dL / Ketone: x  / Bili: x / Urobili: x   Blood: x / Protein: x / Nitrite: x   Leuk Esterase: x / RBC: x / WBC x   Sq Epi: x / Non Sq Epi: x / Bacteria: x        A:  64 yo male seen post operatively s/p Left mini PCNL, recovering well without any complaints. Patient stayed overnight because he felt nervous and for pain control.     Plan:   - pain control   - Reg diet   - f/u labs in am   - possible d/c tmr pending labs and pain control

## 2024-01-30 NOTE — PATIENT PROFILE ADULT - FALL HARM RISK - HARM RISK INTERVENTIONS

## 2024-01-30 NOTE — ASU PREOP CHECKLIST - VIA
stretcher Protopic Pregnancy And Lactation Text: This medication is Pregnancy Category C. It is unknown if this medication is excreted in breast milk when applied topically.

## 2024-01-31 ENCOUNTER — TRANSCRIPTION ENCOUNTER (OUTPATIENT)
Age: 64
End: 2024-01-31

## 2024-01-31 VITALS
OXYGEN SATURATION: 96 % | RESPIRATION RATE: 17 BRPM | SYSTOLIC BLOOD PRESSURE: 138 MMHG | DIASTOLIC BLOOD PRESSURE: 78 MMHG | TEMPERATURE: 98 F | HEART RATE: 59 BPM

## 2024-01-31 LAB
ANION GAP SERPL CALC-SCNC: 9 MMOL/L — SIGNIFICANT CHANGE UP (ref 5–17)
BUN SERPL-MCNC: 18.8 MG/DL — SIGNIFICANT CHANGE UP (ref 8–20)
CALCIUM SERPL-MCNC: 8.8 MG/DL — SIGNIFICANT CHANGE UP (ref 8.4–10.5)
CHLORIDE SERPL-SCNC: 104 MMOL/L — SIGNIFICANT CHANGE UP (ref 96–108)
CO2 SERPL-SCNC: 26 MMOL/L — SIGNIFICANT CHANGE UP (ref 22–29)
CREAT SERPL-MCNC: 1.06 MG/DL — SIGNIFICANT CHANGE UP (ref 0.5–1.3)
EGFR: 79 ML/MIN/1.73M2 — SIGNIFICANT CHANGE UP
GLUCOSE SERPL-MCNC: 104 MG/DL — HIGH (ref 70–99)
HCT VFR BLD CALC: 41.3 % — SIGNIFICANT CHANGE UP (ref 39–50)
HGB BLD-MCNC: 13.6 G/DL — SIGNIFICANT CHANGE UP (ref 13–17)
MCHC RBC-ENTMCNC: 28 PG — SIGNIFICANT CHANGE UP (ref 27–34)
MCHC RBC-ENTMCNC: 32.9 GM/DL — SIGNIFICANT CHANGE UP (ref 32–36)
MCV RBC AUTO: 85.2 FL — SIGNIFICANT CHANGE UP (ref 80–100)
PLATELET # BLD AUTO: 191 K/UL — SIGNIFICANT CHANGE UP (ref 150–400)
POTASSIUM SERPL-MCNC: 4.8 MMOL/L — SIGNIFICANT CHANGE UP (ref 3.5–5.3)
POTASSIUM SERPL-SCNC: 4.8 MMOL/L — SIGNIFICANT CHANGE UP (ref 3.5–5.3)
RBC # BLD: 4.85 M/UL — SIGNIFICANT CHANGE UP (ref 4.2–5.8)
RBC # FLD: 12.7 % — SIGNIFICANT CHANGE UP (ref 10.3–14.5)
SODIUM SERPL-SCNC: 139 MMOL/L — SIGNIFICANT CHANGE UP (ref 135–145)
WBC # BLD: 6.25 K/UL — SIGNIFICANT CHANGE UP (ref 3.8–10.5)
WBC # FLD AUTO: 6.25 K/UL — SIGNIFICANT CHANGE UP (ref 3.8–10.5)

## 2024-01-31 PROCEDURE — C1889: CPT

## 2024-01-31 PROCEDURE — 76000 FLUOROSCOPY <1 HR PHYS/QHP: CPT

## 2024-01-31 PROCEDURE — 82365 CALCULUS SPECTROSCOPY: CPT

## 2024-01-31 PROCEDURE — 85027 COMPLETE CBC AUTOMATED: CPT

## 2024-01-31 PROCEDURE — 80048 BASIC METABOLIC PNL TOTAL CA: CPT

## 2024-01-31 PROCEDURE — C1769: CPT

## 2024-01-31 PROCEDURE — 36415 COLL VENOUS BLD VENIPUNCTURE: CPT

## 2024-01-31 PROCEDURE — C1758: CPT

## 2024-01-31 PROCEDURE — 88300 SURGICAL PATH GROSS: CPT

## 2024-01-31 PROCEDURE — 71045 X-RAY EXAM CHEST 1 VIEW: CPT

## 2024-01-31 RX ADMIN — Medication 500 MILLIGRAM(S): at 05:28

## 2024-01-31 NOTE — PROGRESS NOTE ADULT - SUBJECTIVE AND OBJECTIVE BOX
Subjective:63yMale POD#1 s/l L mini PCNL.  pt feeling well this am, no c/o pain, voiding w/o difficulty. pt is tolerating diet.        Vital Signs Last 24 Hrs  T(C): 36.6 (31 Jan 2024 04:04), Max: 36.7 (30 Jan 2024 21:12)  T(F): 97.8 (31 Jan 2024 04:04), Max: 98 (30 Jan 2024 21:12)  HR: 56 (31 Jan 2024 04:04) (55 - 85)  BP: 124/71 (31 Jan 2024 04:04) (117/76 - 158/80)  BP(mean): 87 (30 Jan 2024 17:13) (87 - 103)  RR: 18 (31 Jan 2024 04:04) (14 - 20)  SpO2: 98% (31 Jan 2024 04:04) (96% - 100%)    Parameters below as of 31 Jan 2024 04:04  Patient On (Oxygen Delivery Method): room air      I&O's Detail    30 Jan 2024 07:01  -  31 Jan 2024 07:00  --------------------------------------------------------  IN:    Lactated Ringers: 675 mL    Oral Fluid: 1200 mL  Total IN: 1875 mL    OUT:    Voided (mL): 550 mL  Total OUT: 550 mL    Total NET: 1325 mL          Labs:                        13.6   6.25  )-----------( 191      ( 31 Jan 2024 06:46 )             41.3     01-31    139  |  104  |  18.8  ----------------------------<  104<H>  4.8   |  26.0  |  1.06    Ca    8.8      31 Jan 2024 06:46

## 2024-01-31 NOTE — DISCHARGE NOTE PROVIDER - HOSPITAL COURSE
64 yo male with h/o left renal calculi, underwent left mini PCNL 1/30/24.  Pt's procedure went well, pt admitted overnight for observation and pain control.  Pt remains stable, pain well controlled, pt tolerating a diet and has been OOB.  Pt will be d/c'd to home today, he will follow up with Dr. Giles in 2-3 weeks.

## 2024-01-31 NOTE — DISCHARGE NOTE PROVIDER - NSDCCPCAREPLAN_GEN_ALL_CORE_FT
PRINCIPAL DISCHARGE DIAGNOSIS  Diagnosis: Renal calculi  Assessment and Plan of Treatment: - you may shower  - remove the left back dressing 2/1/24, cover with dry gauze if needed or a bandaid to cover the small incision  - no baths  - no heavy lifting or straining for 1 week  - drink plenty of fluids  - call the office if you develop a fever, chills, nausea or vomiting  - take antibitics until finished  - take pain medications as needed

## 2024-01-31 NOTE — DISCHARGE NOTE PROVIDER - NSDCCPTREATMENT_GEN_ALL_CORE_FT
PRINCIPAL PROCEDURE  Procedure: Nephrostolithotomy, percutaneous, with lithotripsy, large stone  Findings and Treatment: left side

## 2024-01-31 NOTE — DISCHARGE NOTE PROVIDER - CARE PROVIDER_API CALL
Susy Giles  Urology  [address], [address]  [place] [address]  Phone: [phone]  Fax: [phone]  Follow Up Time: 2 weeks

## 2024-01-31 NOTE — DISCHARGE NOTE NURSING/CASE MANAGEMENT/SOCIAL WORK - PATIENT PORTAL LINK FT
You can access the FollowMyHealth Patient Portal offered by [facility] by registering at the following website: [email or web address]. By joining [facility]’s FollowMyHealth portal, you will also be able to view your health information using other applications (apps) compatible with our system.

## 2024-01-31 NOTE — DISCHARGE NOTE PROVIDER - NSDCMRMEDTOKEN_GEN_ALL_CORE_FT
cephalexin 500 mg oral capsule: 1 cap(s) orally 3 times a day  cholecalciferol 100 mcg (4000 intl units) oral tablet: 1 tab(s) orally once a day  escitalopram 10 mg oral tablet: 1 tab(s) orally once a day  ketorolac 10 mg oral tablet: 1 tab(s) orally every 6 hours  tamsulosin 0.4 mg oral capsule: 1 cap(s) orally once a day (at bedtime)  Tylenol 500 mg oral tablet: 2 tab(s) orally every 6 hours as needed for  pain  Vitamin B-12 1000 mcg/mL injectable solution: 1,000 microgram(s) intramuscularly once a month

## 2024-01-31 NOTE — PROGRESS NOTE ADULT - ASSESSMENT
62 yo male POD#1 s/p L mini PCNL  - pt stable  - reg diet  - OOB/ambulate  - d/c home today  - pt to f/u with Dr. Giles in 2-3 weeks

## 2024-02-01 PROBLEM — Z86.59 PERSONAL HISTORY OF OTHER MENTAL AND BEHAVIORAL DISORDERS: Chronic | Status: ACTIVE | Noted: 2024-01-22

## 2024-02-01 PROBLEM — N20.0 CALCULUS OF KIDNEY: Chronic | Status: ACTIVE | Noted: 2024-01-22

## 2024-02-01 PROBLEM — H33.22 SEROUS RETINAL DETACHMENT, LEFT EYE: Chronic | Status: ACTIVE | Noted: 2024-01-22

## 2024-02-06 ENCOUNTER — APPOINTMENT (OUTPATIENT)
Dept: INTERNAL MEDICINE | Facility: CLINIC | Age: 64
End: 2024-02-06
Payer: COMMERCIAL

## 2024-02-06 ENCOUNTER — APPOINTMENT (OUTPATIENT)
Dept: INTERNAL MEDICINE | Facility: CLINIC | Age: 64
End: 2024-02-06

## 2024-02-06 VITALS
HEIGHT: 69 IN | OXYGEN SATURATION: 98 % | SYSTOLIC BLOOD PRESSURE: 136 MMHG | BODY MASS INDEX: 27.85 KG/M2 | RESPIRATION RATE: 16 BRPM | DIASTOLIC BLOOD PRESSURE: 84 MMHG | TEMPERATURE: 98.2 F | HEART RATE: 72 BPM | WEIGHT: 188 LBS

## 2024-02-06 DIAGNOSIS — N20.0 CALCULUS OF KIDNEY: ICD-10-CM

## 2024-02-06 DIAGNOSIS — D51.0 VITAMIN B12 DEFICIENCY ANEMIA DUE TO INTRINSIC FACTOR DEFICIENCY: ICD-10-CM

## 2024-02-06 DIAGNOSIS — M54.50 LOW BACK PAIN, UNSPECIFIED: ICD-10-CM

## 2024-02-06 DIAGNOSIS — E78.5 HYPERLIPIDEMIA, UNSPECIFIED: ICD-10-CM

## 2024-02-06 LAB — SURGICAL PATHOLOGY STUDY: SIGNIFICANT CHANGE UP

## 2024-02-06 PROCEDURE — 99496 TRANSJ CARE MGMT HIGH F2F 7D: CPT

## 2024-02-06 NOTE — PLAN
[FreeTextEntry1] : Patient presenting today for hospital follow up. Doing well, has been urinating without difficulty and has started having more regular bowel movements with stool softeners and increasing fiber and water intake.  He has follow up with urology scheduled next week.  For history of HLD, he has controlled it with diet. We will check CBC and CMP.  For history of pernicious anemia, we will check b12, folate, vitamin D levels.  For his lumbago likely due to muscle strain, he will start physical therapy.

## 2024-02-06 NOTE — HISTORY OF PRESENT ILLNESS
[Post-hospitalization from ___ Hospital] : Post-hospitalization from [unfilled] Hospital [Admitted on: ___] : The patient was admitted on [unfilled] [Discharged on ___] : discharged on [unfilled] [Patient Contacted By: ____] : and contacted by [unfilled] [Discharge Summary] : discharge summary [Pertinent Labs] : pertinent labs [FreeTextEntry2] : 63 year old male with history of anxiety, vitamin b12 deficiency who presents today for hospital followup.  He had a large left sided kidney stone and had percutaneous nephrolithotomy on 1/30/24. He has not had any complications since the surgery other than being constipated, which has started to improve. He has been urinating without difficulty.  2 weeks ago, he threw out his back when reaching into his fridge for an item. He would like to start physical therapy.  He has a history of pernicious anemia and would like to have his vitamin levels checked. He would also like to check his cholesterol levels given history of HLD.

## 2024-02-08 ENCOUNTER — OFFICE (OUTPATIENT)
Dept: URBAN - METROPOLITAN AREA CLINIC 115 | Facility: CLINIC | Age: 64
Setting detail: OPHTHALMOLOGY
End: 2024-02-08
Payer: COMMERCIAL

## 2024-02-08 DIAGNOSIS — H33.012: ICD-10-CM

## 2024-02-08 DIAGNOSIS — H31.001: ICD-10-CM

## 2024-02-08 DIAGNOSIS — H33.311: ICD-10-CM

## 2024-02-08 DIAGNOSIS — H43.813: ICD-10-CM

## 2024-02-08 DIAGNOSIS — H47.011: ICD-10-CM

## 2024-02-08 DIAGNOSIS — H47.10: ICD-10-CM

## 2024-02-08 PROCEDURE — 99214 OFFICE O/P EST MOD 30 MIN: CPT | Performed by: OPHTHALMOLOGY

## 2024-02-08 PROCEDURE — 92134 CPTRZ OPH DX IMG PST SGM RTA: CPT | Performed by: OPHTHALMOLOGY

## 2024-02-08 ASSESSMENT — CONFRONTATIONAL VISUAL FIELD TEST (CVF)
OD_FINDINGS: FULL
OS_FINDINGS: FULL

## 2024-02-08 ASSESSMENT — LID POSITION - ENTROPION
OS_ENTROPION: 1+
OD_ENTROPION: 1+

## 2024-02-08 ASSESSMENT — REFRACTION_AUTOREFRACTION
OS_SPHERE: UNABLE
OD_SPHERE: +0.50

## 2024-02-08 ASSESSMENT — REFRACTION_CURRENTRX
OD_VPRISM_DIRECTION: SV
OD_SPHERE: +2.50
OS_OVR_VA: 20/
OD_OVR_VA: 20/

## 2024-02-12 LAB
CELL MATERIAL STONE EST-MCNT: SIGNIFICANT CHANGE UP
LABORATORY COMMENT REPORT: SIGNIFICANT CHANGE UP
NIDUS STONE QN: SIGNIFICANT CHANGE UP

## 2024-02-14 ENCOUNTER — APPOINTMENT (OUTPATIENT)
Dept: UROLOGY | Facility: CLINIC | Age: 64
End: 2024-02-14

## 2024-02-23 ENCOUNTER — APPOINTMENT (OUTPATIENT)
Dept: UROLOGY | Facility: CLINIC | Age: 64
End: 2024-02-23
Payer: COMMERCIAL

## 2024-02-23 DIAGNOSIS — E83.50 UNSPECIFIED DISORDER OF CALCIUM METABOLISM: ICD-10-CM

## 2024-02-23 PROCEDURE — 99214 OFFICE O/P EST MOD 30 MIN: CPT | Mod: 24

## 2024-02-23 NOTE — HISTORY OF PRESENT ILLNESS
[FreeTextEntry1] : s/p mini-PCNL no stent reviewed stone:  Calcium Oxalate Monohydrate   incision has healed discussed general recommendations for stone prevention - increasing fluid intake to produce 2 to 2.5 liters of urine per day (approx 3 liters intake), and should be primarily water. - Calcium intake should be approximately 1000 mg per day. - Oxalate intake should be reduced: most common sources in diet which have very high levels include peanuts/nuts, tea, coffee, chocolate, spinach, beets, rhubarb, swiss chard. I've also given/directed to a list with other high oxalate. - Animal flesh protein should be controlled: approx 4 to 6 ounces per day, with some vegetarian days included in the week. Studies have shown that vegetarians have half the risk of stones of people who eat only 4 ounces per day of meat or fish of any kind (beef, chicken, fish, shellfish, pork, etc), indicating that a vegetarian lifestyle can decrease future stone risks. - salt intake should be reduced, as high levels in the diet will increase urinary calcium. less than 2400 mg per day on a low sodium diet is strongly recommended. - Citrate is a benefit; delfino and limes with most citrate and least sugar. Recommend a lemon or lime a day, easiest with concentrate, mixed into water or other beverages.  plan: - follow up in 1 month with new CT and 24 hr

## 2024-03-09 NOTE — PATIENT PROFILE ADULT - HOW PATIENT ADDRESSED, PROFILE
[place]
Alert-The patient is alert, awake and responds to voice. The patient is oriented to time, place, and person. The triage nurse is able to obtain subjective information.

## 2024-03-18 ENCOUNTER — APPOINTMENT (OUTPATIENT)
Dept: INTERNAL MEDICINE | Facility: CLINIC | Age: 64
End: 2024-03-18
Payer: COMMERCIAL

## 2024-03-18 VITALS
HEART RATE: 78 BPM | BODY MASS INDEX: 27.85 KG/M2 | OXYGEN SATURATION: 95 % | RESPIRATION RATE: 14 BRPM | TEMPERATURE: 98.2 F | HEIGHT: 69 IN | WEIGHT: 188 LBS | SYSTOLIC BLOOD PRESSURE: 120 MMHG | DIASTOLIC BLOOD PRESSURE: 80 MMHG

## 2024-03-18 DIAGNOSIS — E55.9 VITAMIN D DEFICIENCY, UNSPECIFIED: ICD-10-CM

## 2024-03-18 DIAGNOSIS — F32.A DEPRESSION, UNSPECIFIED: ICD-10-CM

## 2024-03-18 PROCEDURE — 99214 OFFICE O/P EST MOD 30 MIN: CPT

## 2024-03-18 RX ORDER — ESCITALOPRAM OXALATE 5 MG/1
5 TABLET ORAL
Qty: 90 | Refills: 2 | Status: ACTIVE | COMMUNITY
Start: 2024-03-18 | End: 1900-01-01

## 2024-03-18 NOTE — HISTORY OF PRESENT ILLNESS
[de-identified] : 63 year old male who presents for followup. Feels well and has no acute complaints.  He has been on Lexapro for depression and needs a refill. It has been working well for him. He would like to check his vitamin levels today.

## 2024-03-18 NOTE — PLAN
[FreeTextEntry1] : Depression: continue Lexapro 5 mg daily.  Vitamin B12 deficiency: check b12 and folate levels Vitamin D deficiency: check vitamin D levels.

## 2024-04-08 ENCOUNTER — APPOINTMENT (OUTPATIENT)
Dept: INTERNAL MEDICINE | Facility: CLINIC | Age: 64
End: 2024-04-08
Payer: COMMERCIAL

## 2024-04-08 VITALS
WEIGHT: 206 LBS | SYSTOLIC BLOOD PRESSURE: 130 MMHG | TEMPERATURE: 98 F | HEART RATE: 63 BPM | HEIGHT: 69 IN | RESPIRATION RATE: 14 BRPM | DIASTOLIC BLOOD PRESSURE: 90 MMHG | BODY MASS INDEX: 30.51 KG/M2 | OXYGEN SATURATION: 98 %

## 2024-04-08 DIAGNOSIS — E03.9 HYPOTHYROIDISM, UNSPECIFIED: ICD-10-CM

## 2024-04-08 DIAGNOSIS — N13.8 BENIGN PROSTATIC HYPERPLASIA WITH LOWER URINARY TRACT SYMPMS: ICD-10-CM

## 2024-04-08 DIAGNOSIS — N40.1 BENIGN PROSTATIC HYPERPLASIA WITH LOWER URINARY TRACT SYMPMS: ICD-10-CM

## 2024-04-08 DIAGNOSIS — R53.83 OTHER FATIGUE: ICD-10-CM

## 2024-04-08 PROCEDURE — 99214 OFFICE O/P EST MOD 30 MIN: CPT

## 2024-04-08 NOTE — HISTORY OF PRESENT ILLNESS
[de-identified] : 63 year old male who presents today for followup.  He has had chronic joint pains, fatigue and inflammation for many years.  He was told in the past he had a high GGT and would like this to be checked again.  Otherwise feeling well.

## 2024-04-12 ENCOUNTER — APPOINTMENT (OUTPATIENT)
Dept: UROLOGY | Facility: CLINIC | Age: 64
End: 2024-04-12

## 2024-05-15 DIAGNOSIS — E53.8 DEFICIENCY OF OTHER SPECIFIED B GROUP VITAMINS: ICD-10-CM

## 2024-10-03 ENCOUNTER — NON-APPOINTMENT (OUTPATIENT)
Age: 64
End: 2024-10-03

## 2024-11-08 ENCOUNTER — OFFICE (OUTPATIENT)
Dept: URBAN - METROPOLITAN AREA CLINIC 88 | Facility: CLINIC | Age: 64
Setting detail: OPHTHALMOLOGY
End: 2024-11-08
Payer: COMMERCIAL

## 2024-11-08 ENCOUNTER — APPOINTMENT (OUTPATIENT)
Dept: INTERNAL MEDICINE | Facility: CLINIC | Age: 64
End: 2024-11-08
Payer: COMMERCIAL

## 2024-11-08 VITALS
RESPIRATION RATE: 16 BRPM | HEART RATE: 76 BPM | TEMPERATURE: 98.4 F | SYSTOLIC BLOOD PRESSURE: 130 MMHG | OXYGEN SATURATION: 98 % | HEIGHT: 69 IN | DIASTOLIC BLOOD PRESSURE: 78 MMHG

## 2024-11-08 DIAGNOSIS — D51.0 VITAMIN B12 DEFICIENCY ANEMIA DUE TO INTRINSIC FACTOR DEFICIENCY: ICD-10-CM

## 2024-11-08 DIAGNOSIS — E55.9 VITAMIN D DEFICIENCY, UNSPECIFIED: ICD-10-CM

## 2024-11-08 DIAGNOSIS — H31.001: ICD-10-CM

## 2024-11-08 DIAGNOSIS — E53.8 DEFICIENCY OF OTHER SPECIFIED B GROUP VITAMINS: ICD-10-CM

## 2024-11-08 DIAGNOSIS — H33.012: ICD-10-CM

## 2024-11-08 DIAGNOSIS — H47.011: ICD-10-CM

## 2024-11-08 DIAGNOSIS — H43.813: ICD-10-CM

## 2024-11-08 DIAGNOSIS — H33.311: ICD-10-CM

## 2024-11-08 DIAGNOSIS — H47.10: ICD-10-CM

## 2024-11-08 PROCEDURE — 92014 COMPRE OPH EXAM EST PT 1/>: CPT | Performed by: OPHTHALMOLOGY

## 2024-11-08 PROCEDURE — 99203 OFFICE O/P NEW LOW 30 MIN: CPT

## 2024-11-08 PROCEDURE — 92083 EXTENDED VISUAL FIELD XM: CPT | Performed by: OPHTHALMOLOGY

## 2024-11-08 PROCEDURE — 92134 CPTRZ OPH DX IMG PST SGM RTA: CPT | Performed by: OPHTHALMOLOGY

## 2024-11-08 ASSESSMENT — REFRACTION_AUTOREFRACTION
OS_SPHERE: UNABLE
OD_SPHERE: +0.50

## 2024-11-08 ASSESSMENT — LID POSITION - ENTROPION
OD_ENTROPION: 1+
OS_ENTROPION: 1+

## 2024-11-08 ASSESSMENT — CONFRONTATIONAL VISUAL FIELD TEST (CVF)
OD_FINDINGS: FULL
OS_FINDINGS: FULL

## 2024-11-08 ASSESSMENT — KERATOMETRY
OD_AXISANGLE_DEGREES: 112
OS_K2POWER_DIOPTERS: 44.00
OS_AXISANGLE_DEGREES: 057
OD_K2POWER_DIOPTERS: 44.00
OS_K1POWER_DIOPTERS: 42.50
OD_K1POWER_DIOPTERS: 43.50

## 2024-11-08 ASSESSMENT — TONOMETRY
OS_IOP_MMHG: 20
OD_IOP_MMHG: 18

## 2024-11-08 ASSESSMENT — VISUAL ACUITY
OS_BCVA: 20/20
OD_BCVA: LP

## 2024-11-13 NOTE — ASU PREOP CHECKLIST - HAIR REMOVAL
hair removal not indicated Assistance with ambulation/Assistance OOB with selected safe patient handling equipment/Communicate Risk of Fall with Harm to all staff/Reinforce activity limits and safety measures with patient and family/Review medications for side effects contributing to fall risk/Sit up slowly, dangle for a short time, stand at bedside before walking/Tailored Fall Risk Interventions/Toileting schedule using arm’s reach rule for commode and bathroom/Visual Cue: Yellow wristband and red socks/Bed in lowest position, wheels locked, appropriate side rails in place/Call bell, personal items and telephone in reach/Instruct patient to call for assistance before getting out of bed or chair/Non-slip footwear when patient is out of bed/[place] to call system/Physically safe environment - no spills, clutter or unnecessary equipment/Purposeful Proactive Rounding/Room/bathroom lighting operational, light cord in reach

## 2024-11-27 ENCOUNTER — OFFICE (OUTPATIENT)
Dept: URBAN - METROPOLITAN AREA CLINIC 115 | Facility: CLINIC | Age: 64
Setting detail: OPHTHALMOLOGY
End: 2024-11-27
Payer: COMMERCIAL

## 2024-11-27 DIAGNOSIS — H31.001: ICD-10-CM

## 2024-11-27 DIAGNOSIS — H47.011: ICD-10-CM

## 2024-11-27 DIAGNOSIS — H43.813: ICD-10-CM

## 2024-11-27 DIAGNOSIS — H47.10: ICD-10-CM

## 2024-11-27 DIAGNOSIS — H33.012: ICD-10-CM

## 2024-11-27 DIAGNOSIS — H53.433: ICD-10-CM

## 2024-11-27 DIAGNOSIS — H47.211: ICD-10-CM

## 2024-11-27 PROCEDURE — 92012 INTRM OPH EXAM EST PATIENT: CPT | Performed by: OPHTHALMOLOGY

## 2024-11-27 PROCEDURE — 92083 EXTENDED VISUAL FIELD XM: CPT | Performed by: OPHTHALMOLOGY

## 2024-11-27 PROCEDURE — 92133 CPTRZD OPH DX IMG PST SGM ON: CPT | Performed by: OPHTHALMOLOGY

## 2024-11-27 ASSESSMENT — TONOMETRY
OS_IOP_MMHG: 10
OD_IOP_MMHG: 16

## 2024-11-27 ASSESSMENT — REFRACTION_AUTOREFRACTION
OD_SPHERE: +0.50
OS_SPHERE: UNABLE

## 2024-11-27 ASSESSMENT — KERATOMETRY
OS_K1POWER_DIOPTERS: 42.50
OS_K2POWER_DIOPTERS: 44.00
OD_AXISANGLE_DEGREES: 112
OD_K1POWER_DIOPTERS: 43.50
OS_AXISANGLE_DEGREES: 057
OD_K2POWER_DIOPTERS: 44.00

## 2024-11-27 ASSESSMENT — LID POSITION - ENTROPION
OD_ENTROPION: 1+
OS_ENTROPION: 1+

## 2024-11-27 ASSESSMENT — VISUAL ACUITY
OD_BCVA: LP
OS_BCVA: 20/25+1

## 2024-12-22 NOTE — ED STATDOCS - GASTROINTESTINAL NEGATIVE STATEMENT, MLM
no abdominal pain, no bloating, no constipation, no diarrhea, no nausea and no vomiting. Pt reports he was falling forward and hit his arm to the wall. Felt pain to the upper arms. Pain increases with full flexion. On exam not tenderness of the arms and elbows. Mild tenderness of the bilateral trapezius. No tenderness of the shoulders.

## 2025-02-12 DIAGNOSIS — E53.8 DEFICIENCY OF OTHER SPECIFIED B GROUP VITAMINS: ICD-10-CM

## 2025-03-10 ENCOUNTER — APPOINTMENT (OUTPATIENT)
Dept: NEUROLOGY | Facility: CLINIC | Age: 65
End: 2025-03-10

## 2025-03-20 ENCOUNTER — APPOINTMENT (OUTPATIENT)
Age: 65
End: 2025-03-20
Payer: COMMERCIAL

## 2025-03-20 ENCOUNTER — NON-APPOINTMENT (OUTPATIENT)
Age: 65
End: 2025-03-20

## 2025-03-20 VITALS
WEIGHT: 210 LBS | RESPIRATION RATE: 18 BRPM | HEIGHT: 69 IN | OXYGEN SATURATION: 99 % | DIASTOLIC BLOOD PRESSURE: 84 MMHG | TEMPERATURE: 97.8 F | HEART RATE: 60 BPM | BODY MASS INDEX: 31.1 KG/M2 | SYSTOLIC BLOOD PRESSURE: 130 MMHG

## 2025-03-20 DIAGNOSIS — T78.40XA ALLERGY, UNSPECIFIED, INITIAL ENCOUNTER: ICD-10-CM

## 2025-03-20 DIAGNOSIS — Z77.120 CONTACT WITH AND (SUSPECTED) EXPOSURE TO MOLD (TOXIC): ICD-10-CM

## 2025-03-20 DIAGNOSIS — T60.1X4A: ICD-10-CM

## 2025-03-20 DIAGNOSIS — R20.2 PARESTHESIA OF SKIN: ICD-10-CM

## 2025-03-20 PROCEDURE — 99203 OFFICE O/P NEW LOW 30 MIN: CPT

## 2025-04-17 ENCOUNTER — NON-APPOINTMENT (OUTPATIENT)
Age: 65
End: 2025-04-17

## 2025-05-14 ENCOUNTER — APPOINTMENT (OUTPATIENT)
Dept: NEUROLOGY | Facility: CLINIC | Age: 65
End: 2025-05-14
Payer: COMMERCIAL

## 2025-05-14 VITALS
OXYGEN SATURATION: 99 % | SYSTOLIC BLOOD PRESSURE: 140 MMHG | WEIGHT: 208 LBS | BODY MASS INDEX: 29.12 KG/M2 | HEART RATE: 63 BPM | HEIGHT: 71 IN | DIASTOLIC BLOOD PRESSURE: 84 MMHG

## 2025-05-14 DIAGNOSIS — G37.3 ACUTE TRANSVERSE MYELITIS IN DEMYELINATING DISEASE OF CENTRAL NERVOUS SYSTEM: ICD-10-CM

## 2025-05-14 PROCEDURE — G2211 COMPLEX E/M VISIT ADD ON: CPT | Mod: NC

## 2025-05-14 PROCEDURE — 99205 OFFICE O/P NEW HI 60 MIN: CPT

## 2025-07-24 ENCOUNTER — NON-APPOINTMENT (OUTPATIENT)
Age: 65
End: 2025-07-24

## 2025-08-07 ENCOUNTER — APPOINTMENT (OUTPATIENT)
Age: 65
End: 2025-08-07

## 2025-08-07 DIAGNOSIS — T78.40XA ALLERGY, UNSPECIFIED, INITIAL ENCOUNTER: ICD-10-CM

## 2025-08-07 DIAGNOSIS — R53.83 OTHER FATIGUE: ICD-10-CM

## 2025-08-07 DIAGNOSIS — R20.2 PARESTHESIA OF SKIN: ICD-10-CM

## 2025-08-07 PROCEDURE — 99212 OFFICE O/P EST SF 10 MIN: CPT | Mod: 93

## (undated) DEVICE — SUT VICRYL 2-0 27" SH UNDYED

## (undated) DEVICE — PACK LITHOTOMY

## (undated) DEVICE — SYR LUER LOK 5CC

## (undated) DEVICE — LAP PAD W RING 18 X 18"

## (undated) DEVICE — SYR CONTROL LUER LOK 10CC

## (undated) DEVICE — NDL BIOPSY TROCAR TWO-PART 18G X 20CM

## (undated) DEVICE — BLADE SURGICAL #10 CARBON

## (undated) DEVICE — SPECIMEN CONTAINER 100ML

## (undated) DEVICE — VISITEC 4X4

## (undated) DEVICE — TUBING CONNECTING 6MM 20FT

## (undated) DEVICE — GLV 7.5 PROTEXIS (WHITE)

## (undated) DEVICE — SYR CATH TIP 2 OZ

## (undated) DEVICE — DRAPE 3/4 SHEET 52X76"

## (undated) DEVICE — SYR LUER LOK 10CC

## (undated) DEVICE — TUBING TUR 2 PRONG

## (undated) DEVICE — DRAPE TOWEL BLUE 17" X 24"

## (undated) DEVICE — DRAPE NEPHROSCOPY 72X118"

## (undated) DEVICE — IRR BULB PATHFINDER + 10"